# Patient Record
Sex: FEMALE | Race: OTHER | Employment: OTHER | URBAN - METROPOLITAN AREA
[De-identification: names, ages, dates, MRNs, and addresses within clinical notes are randomized per-mention and may not be internally consistent; named-entity substitution may affect disease eponyms.]

---

## 2017-01-27 ENCOUNTER — OFFICE VISIT (OUTPATIENT)
Dept: FAMILY MEDICINE CLINIC | Facility: CLINIC | Age: 67
End: 2017-01-27

## 2017-01-27 VITALS
SYSTOLIC BLOOD PRESSURE: 116 MMHG | HEART RATE: 84 BPM | DIASTOLIC BLOOD PRESSURE: 64 MMHG | HEIGHT: 57 IN | OXYGEN SATURATION: 99 % | TEMPERATURE: 99 F | RESPIRATION RATE: 14 BRPM | BODY MASS INDEX: 31.28 KG/M2 | WEIGHT: 145 LBS

## 2017-01-27 DIAGNOSIS — I10 ESSENTIAL HYPERTENSION WITH GOAL BLOOD PRESSURE LESS THAN 130/80: ICD-10-CM

## 2017-01-27 DIAGNOSIS — Z12.39 SCREENING FOR BREAST CANCER: ICD-10-CM

## 2017-01-27 DIAGNOSIS — Z79.899 ENCOUNTER FOR LONG-TERM (CURRENT) USE OF MEDICATIONS: Primary | ICD-10-CM

## 2017-01-27 DIAGNOSIS — Z23 NEED FOR IMMUNIZATION AGAINST INFLUENZA: ICD-10-CM

## 2017-01-27 DIAGNOSIS — M17.0 PRIMARY OSTEOARTHRITIS OF BOTH KNEES: ICD-10-CM

## 2017-01-27 PROCEDURE — 90471 IMMUNIZATION ADMIN: CPT | Performed by: FAMILY MEDICINE

## 2017-01-27 PROCEDURE — 90662 IIV NO PRSV INCREASED AG IM: CPT | Performed by: FAMILY MEDICINE

## 2017-01-27 PROCEDURE — 99213 OFFICE O/P EST LOW 20 MIN: CPT | Performed by: FAMILY MEDICINE

## 2017-01-27 RX ORDER — LOSARTAN POTASSIUM AND HYDROCHLOROTHIAZIDE 25; 100 MG/1; MG/1
1 TABLET ORAL DAILY
Qty: 90 TABLET | Refills: 1 | Status: SHIPPED | OUTPATIENT
Start: 2017-01-27 | End: 2017-07-24

## 2017-01-27 NOTE — PATIENT INSTRUCTIONS
Established High Blood Pressure    High blood pressure (hypertension) is a chronic disease. Often health care providers don’t know what causes it. But it can be caused by certain health conditions and medicines.   If you have high blood pressure, you may · Don’t take medicines that have heart stimulants. This includes many cold and sinus decongestant pills and sprays, as well as diet pills. Check the warnings about hypertension on the label.  Stimulants such as amphetamine or cocaine could be lethal for jose © 8126-8056 [address], [address] [place]. All rights reserved. This information is not intended as a substitute for professional medical care. Always follow your healthcare professional's instructions.

## 2017-01-30 NOTE — PROGRESS NOTES
Yaa Sumner is a 73year old female.   Patient presents with:  Medication Follow-Up: Losartan    HPI:   HTN:Patient is seen for follow up and medication refill, states compliance with medication and diet, does not exercise due to knee pain, denies DO Encounter for long-term (current) use of medications    Essential hypertension with goal blood pressure less than 130/80  -     Losartan Potassium-HCTZ 100-25 MG Oral Tab; Take 1 tablet by mouth daily. -     Comp Metabolic Panel (14) [E];  Future  -     Benjie Chairez · Cough medicines are available but it is unclear how effective they actually are. · Take acetaminophen or an NSAID, such as ibuprofen to ease throat pain  Ease digestive problems  · Put fluid back into your body.  Take frequent sips of clear liquids such

## 2017-01-31 LAB
ALBUMIN/GLOBULIN RATIO: 1.4 (CALC) (ref 1–2.5)
ALBUMIN: 4.2 G/DL (ref 3.6–5.1)
ALKALINE PHOSPHATASE: 75 U/L (ref 33–130)
ALT: 14 U/L (ref 6–29)
AST: 16 U/L (ref 10–35)
BILIRUBIN, TOTAL: 0.6 MG/DL (ref 0.2–1.2)
BUN: 11 MG/DL (ref 7–25)
CALCIUM: 9.8 MG/DL (ref 8.6–10.4)
CARBON DIOXIDE: 27 MMOL/L (ref 20–31)
CHLORIDE: 102 MMOL/L (ref 98–110)
CHOL/HDLC RATIO: 4.1 (CALC)
CHOLESTEROL, TOTAL: 203 MG/DL (ref 125–200)
CREATININE: 0.64 MG/DL (ref 0.5–0.99)
EGFR IF AFRICN AM: 109 ML/MIN/1.73M2
EGFR IF NONAFRICN AM: 94 ML/MIN/1.73M2
GLOBULIN: 2.9 G/DL (CALC) (ref 1.9–3.7)
GLUCOSE: 94 MG/DL (ref 65–99)
HDL CHOLESTEROL: 49 MG/DL
LDL-CHOLESTEROL: 121 MG/DL (CALC)
NON-HDL CHOLESTEROL: 154 MG/DL (CALC)
POTASSIUM: 4 MMOL/L (ref 3.5–5.3)
PROTEIN, TOTAL: 7.1 G/DL (ref 6.1–8.1)
SODIUM: 137 MMOL/L (ref 135–146)
TRIGLYCERIDES: 165 MG/DL

## 2017-02-02 NOTE — PROGRESS NOTES
Quick Note:    Spoke to patient informed of results and information given. Had patient repeat some information back to me. So that I knew she understood.   ______

## 2017-02-04 ENCOUNTER — HOSPITAL ENCOUNTER (OUTPATIENT)
Dept: MAMMOGRAPHY | Facility: HOSPITAL | Age: 67
Discharge: HOME | End: 2017-02-04
Attending: FAMILY MEDICINE
Payer: COMMERCIAL

## 2017-02-04 DIAGNOSIS — Z12.39 SCREENING FOR BREAST CANCER: ICD-10-CM

## 2017-02-04 PROCEDURE — 77067 SCR MAMMO BI INCL CAD: CPT

## 2017-07-14 PROBLEM — M17.12 PRIMARY OSTEOARTHRITIS OF LEFT KNEE: Status: ACTIVE | Noted: 2017-07-14

## 2017-07-24 ENCOUNTER — OFFICE VISIT (OUTPATIENT)
Dept: FAMILY MEDICINE CLINIC | Facility: CLINIC | Age: 67
End: 2017-07-24

## 2017-07-24 VITALS
TEMPERATURE: 98 F | BODY MASS INDEX: 31.07 KG/M2 | SYSTOLIC BLOOD PRESSURE: 102 MMHG | OXYGEN SATURATION: 99 % | WEIGHT: 144 LBS | HEART RATE: 86 BPM | HEIGHT: 57.25 IN | RESPIRATION RATE: 16 BRPM | DIASTOLIC BLOOD PRESSURE: 58 MMHG

## 2017-07-24 DIAGNOSIS — Z79.899 ENCOUNTER FOR LONG-TERM (CURRENT) USE OF MEDICATIONS: Primary | ICD-10-CM

## 2017-07-24 DIAGNOSIS — I10 ESSENTIAL HYPERTENSION WITH GOAL BLOOD PRESSURE LESS THAN 130/80: ICD-10-CM

## 2017-07-24 DIAGNOSIS — M17.12 PRIMARY OSTEOARTHRITIS OF LEFT KNEE: ICD-10-CM

## 2017-07-24 PROCEDURE — 99213 OFFICE O/P EST LOW 20 MIN: CPT | Performed by: FAMILY MEDICINE

## 2017-07-24 RX ORDER — LOSARTAN POTASSIUM AND HYDROCHLOROTHIAZIDE 25; 100 MG/1; MG/1
1 TABLET ORAL DAILY
Qty: 90 TABLET | Refills: 1 | Status: SHIPPED | OUTPATIENT
Start: 2017-07-24 | End: 2018-02-01

## 2017-07-24 NOTE — PROGRESS NOTES
Calvin Lomeli is a 73year old female.   Patient presents with:  Medication Follow-Up    HPI:   HTN:Patient is seen for follow up and medication refill, states compliance with medication and diet, does not exercise due to knee pain, denies FLORES, palpita this visit:    Encounter for long-term (current) use of medications    Essential hypertension with goal blood pressure less than 130/80  -     Losartan Potassium-HCTZ 100-25 MG Oral Tab; Take 1 tablet by mouth daily.   Encouraged low salt diet and to contin

## 2017-07-24 NOTE — PATIENT INSTRUCTIONS
Osteoarthritis: Tips for Daily Living     Lift items with both hands. Making a few changes in your daily life can reduce stress on your joints. This helps protect the joints from further damage.   Your surroundings  Make your home work for you:  · Edyta Roth © 9072-4483 The [address] [place] [address], [address] [place]. All rights reserved. This information is not intended as a substitute for professional medical care. Always follow your healthcare professional's instructions.         Control · When you can, walk or bike instead of driving. · Smith leaves, garden, or do household repairs.   · Be active at a moderate to vigorous level of physical activity for at least 40 minutes for a minimum of 3 to 4 days a week.   Manage stress  · Make time to

## 2017-09-26 ENCOUNTER — OFFICE VISIT (OUTPATIENT)
Dept: FAMILY MEDICINE CLINIC | Facility: CLINIC | Age: 67
End: 2017-09-26

## 2017-09-26 VITALS
HEIGHT: 57 IN | BODY MASS INDEX: 30.29 KG/M2 | WEIGHT: 140.38 LBS | SYSTOLIC BLOOD PRESSURE: 104 MMHG | RESPIRATION RATE: 14 BRPM | DIASTOLIC BLOOD PRESSURE: 68 MMHG | TEMPERATURE: 98 F | HEART RATE: 74 BPM

## 2017-09-26 DIAGNOSIS — Z01.818 PREOP GENERAL PHYSICAL EXAM: Primary | ICD-10-CM

## 2017-09-26 DIAGNOSIS — M17.12 PRIMARY OSTEOARTHRITIS OF LEFT KNEE: ICD-10-CM

## 2017-09-26 DIAGNOSIS — E66.9 OBESITY (BMI 30.0-34.9): ICD-10-CM

## 2017-09-26 DIAGNOSIS — I10 ESSENTIAL HYPERTENSION: ICD-10-CM

## 2017-09-26 PROCEDURE — 93000 ELECTROCARDIOGRAM COMPLETE: CPT | Performed by: FAMILY MEDICINE

## 2017-09-26 PROCEDURE — 87081 CULTURE SCREEN ONLY: CPT | Performed by: FAMILY MEDICINE

## 2017-09-26 PROCEDURE — 87147 CULTURE TYPE IMMUNOLOGIC: CPT | Performed by: FAMILY MEDICINE

## 2017-09-26 PROCEDURE — 99243 OFF/OP CNSLTJ NEW/EST LOW 30: CPT | Performed by: FAMILY MEDICINE

## 2017-09-26 NOTE — PROGRESS NOTES
Shraddha Wells is a 78year old female who presents for a pre-operative physical exam.   Patient presents with:  Pre-Op Exam: Pre-op surgery on 10/16/17 total knee replacement on Left knee.       HPI:   Patient is seen for preoperative physical exam and chest pain on exertion  GI: denies abdominal pain,denies heartburn  MUSCULOSKELETAL: denies back pain  NEURO: denies headaches  PSYCHE: denies depression or anxiety  HEMATOLOGIC: denies hx of anemia  ENDOCRINE: denies thyroid history  ALL/ASTHMA: denies hx

## 2017-09-27 LAB
ABSOLUTE BASOPHILS: 49 CELLS/UL (ref 0–200)
ABSOLUTE EOSINOPHILS: 555 CELLS/UL (ref 15–500)
ABSOLUTE LYMPHOCYTES: 2385 CELLS/UL (ref 850–3900)
ABSOLUTE MONOCYTES: 506 CELLS/UL (ref 200–950)
ABSOLUTE NEUTROPHILS: 2605 CELLS/UL (ref 1500–7800)
ALBUMIN/GLOBULIN RATIO: 1.8 (CALC) (ref 1–2.5)
ALBUMIN: 4.4 G/DL (ref 3.6–5.1)
ALKALINE PHOSPHATASE: 65 U/L (ref 33–130)
ALT: 12 U/L (ref 6–29)
AST: 16 U/L (ref 10–35)
BASOPHILS: 0.8 %
BILIRUBIN, TOTAL: 0.7 MG/DL (ref 0.2–1.2)
BUN: 11 MG/DL (ref 7–25)
CALCIUM: 9.8 MG/DL (ref 8.6–10.4)
CARBON DIOXIDE: 28 MMOL/L (ref 20–31)
CHLORIDE: 102 MMOL/L (ref 98–110)
CREATININE: 0.58 MG/DL (ref 0.5–0.99)
EGFR IF AFRICN AM: 111 ML/MIN/1.73M2
EGFR IF NONAFRICN AM: 96 ML/MIN/1.73M2
EOSINOPHILS: 9.1 %
GLOBULIN: 2.5 G/DL (CALC) (ref 1.9–3.7)
GLUCOSE: 81 MG/DL (ref 65–99)
HEMATOCRIT: 33.3 % (ref 35–45)
HEMOGLOBIN: 11.5 G/DL (ref 11.7–15.5)
LYMPHOCYTES: 39.1 %
MCH: 29.4 PG (ref 27–33)
MCHC: 34.5 G/DL (ref 32–36)
MCV: 85.2 FL (ref 80–100)
MONOCYTES: 8.3 %
MPV: 11.5 FL (ref 7.5–12.5)
NEUTROPHILS: 42.7 %
PLATELET COUNT: 238 THOUSAND/UL (ref 140–400)
POTASSIUM: 3.7 MMOL/L (ref 3.5–5.3)
PROTEIN, TOTAL: 6.9 G/DL (ref 6.1–8.1)
RDW: 13 % (ref 11–15)
RED BLOOD CELL COUNT: 3.91 MILLION/UL (ref 3.8–5.1)
SODIUM: 138 MMOL/L (ref 135–146)
WHITE BLOOD CELL COUNT: 6.1 THOUSAND/UL (ref 3.8–10.8)

## 2017-10-03 ENCOUNTER — TELEPHONE (OUTPATIENT)
Dept: FAMILY MEDICINE CLINIC | Facility: CLINIC | Age: 67
End: 2017-10-03

## 2017-10-03 NOTE — TELEPHONE ENCOUNTER
Daughter states someone called her Saturday and said her mom has an infection. We called in a script, but [facility] says they do not have it. Transferred to triage line.

## 2017-10-03 NOTE — TELEPHONE ENCOUNTER
Spoke to daughter sent Rx to [facility]. Daughter aware. Spoke to pharmacy the Rx sent to [facility] is not available.

## 2017-10-03 NOTE — TELEPHONE ENCOUNTER
Need to check other pharmacies as this is the recommended medication, needs to start at least 5 days before surgery.

## 2017-10-09 ENCOUNTER — HOSPITAL ENCOUNTER (OUTPATIENT)
Dept: PHYSICAL THERAPY | Facility: HOSPITAL | Age: 67
Discharge: HOME | End: 2017-10-09
Attending: ORTHOPAEDIC SURGERY
Payer: COMMERCIAL

## 2017-10-09 ENCOUNTER — LABORATORY ENCOUNTER (OUTPATIENT)
Dept: LAB | Facility: HOSPITAL | Age: 67
End: 2017-10-09
Payer: COMMERCIAL

## 2017-10-09 DIAGNOSIS — M17.12 PRIMARY OSTEOARTHRITIS OF LEFT KNEE: ICD-10-CM

## 2017-10-09 PROCEDURE — 36415 COLL VENOUS BLD VENIPUNCTURE: CPT

## 2017-10-09 PROCEDURE — 86850 RBC ANTIBODY SCREEN: CPT

## 2017-10-09 PROCEDURE — 86901 BLOOD TYPING SEROLOGIC RH(D): CPT

## 2017-10-09 PROCEDURE — 86900 BLOOD TYPING SEROLOGIC ABO: CPT

## 2017-10-10 ENCOUNTER — TELEPHONE (OUTPATIENT)
Dept: FAMILY MEDICINE CLINIC | Facility: CLINIC | Age: 67
End: 2017-10-10

## 2017-10-16 ENCOUNTER — ANESTHESIA (OUTPATIENT)
Dept: SURGERY | Facility: HOSPITAL | Age: 67
DRG: 470 | End: 2017-10-16
Payer: COMMERCIAL

## 2017-10-16 ENCOUNTER — APPOINTMENT (OUTPATIENT)
Dept: GENERAL RADIOLOGY | Facility: HOSPITAL | Age: 67
DRG: 470 | End: 2017-10-16
Attending: ORTHOPAEDIC SURGERY
Payer: COMMERCIAL

## 2017-10-16 ENCOUNTER — HOSPITAL ENCOUNTER (INPATIENT)
Facility: HOSPITAL | Age: 67
LOS: 2 days | Discharge: HOME HEALTH | DRG: 470 | End: 2017-10-18
Attending: ORTHOPAEDIC SURGERY | Admitting: ORTHOPAEDIC SURGERY
Payer: COMMERCIAL

## 2017-10-16 ENCOUNTER — ANESTHESIA EVENT (OUTPATIENT)
Dept: SURGERY | Facility: HOSPITAL | Age: 67
DRG: 470 | End: 2017-10-16
Payer: COMMERCIAL

## 2017-10-16 ENCOUNTER — SURGERY (OUTPATIENT)
Age: 67
End: 2017-10-16

## 2017-10-16 DIAGNOSIS — M17.12 PRIMARY OSTEOARTHRITIS OF LEFT KNEE: Primary | ICD-10-CM

## 2017-10-16 PROCEDURE — 3E0T3BZ INTRODUCTION OF ANESTHETIC AGENT INTO PERIPHERAL NERVES AND PLEXI, PERCUTANEOUS APPROACH: ICD-10-PCS | Performed by: ANESTHESIOLOGY

## 2017-10-16 PROCEDURE — 0SRD0J9 REPLACEMENT OF LEFT KNEE JOINT WITH SYNTHETIC SUBSTITUTE, CEMENTED, OPEN APPROACH: ICD-10-PCS | Performed by: ORTHOPAEDIC SURGERY

## 2017-10-16 PROCEDURE — 73560 X-RAY EXAM OF KNEE 1 OR 2: CPT | Performed by: ORTHOPAEDIC SURGERY

## 2017-10-16 PROCEDURE — 99223 1ST HOSP IP/OBS HIGH 75: CPT | Performed by: INTERNAL MEDICINE

## 2017-10-16 DEVICE — CEMENT BONE ZIM PALICOS R +G: Type: IMPLANTABLE DEVICE | Site: KNEE | Status: FUNCTIONAL

## 2017-10-16 DEVICE — ALL POLY PAT VE 29MM: Type: IMPLANTABLE DEVICE | Site: KNEE | Status: FUNCTIONAL

## 2017-10-16 DEVICE — KIT TISS CLOS TISSEEL 4ML: Type: IMPLANTABLE DEVICE | Site: KNEE | Status: FUNCTIONAL

## 2017-10-16 DEVICE — PSN JIGS PREF & TIB ROT L: Type: IMPLANTABLE DEVICE | Site: KNEE | Status: FUNCTIONAL

## 2017-10-16 DEVICE — PSN TIB STM 5 DEG SZ D L: Type: IMPLANTABLE DEVICE | Site: KNEE | Status: FUNCTIONAL

## 2017-10-16 DEVICE — IMPLANTABLE DEVICE: Type: IMPLANTABLE DEVICE | Site: KNEE | Status: FUNCTIONAL

## 2017-10-16 RX ORDER — DIPHENHYDRAMINE HYDROCHLORIDE 50 MG/ML
12.5 INJECTION INTRAMUSCULAR; INTRAVENOUS EVERY 4 HOURS PRN
Status: DISCONTINUED | OUTPATIENT
Start: 2017-10-16 | End: 2017-10-18

## 2017-10-16 RX ORDER — OXYCODONE HYDROCHLORIDE 10 MG/1
10 TABLET ORAL EVERY 4 HOURS PRN
Status: DISPENSED | OUTPATIENT
Start: 2017-10-16 | End: 2017-10-18

## 2017-10-16 RX ORDER — MIDAZOLAM HYDROCHLORIDE 1 MG/ML
1 INJECTION INTRAMUSCULAR; INTRAVENOUS EVERY 5 MIN PRN
Status: DISCONTINUED | OUTPATIENT
Start: 2017-10-16 | End: 2017-10-16 | Stop reason: HOSPADM

## 2017-10-16 RX ORDER — BISACODYL 10 MG
10 SUPPOSITORY, RECTAL RECTAL
Status: DISCONTINUED | OUTPATIENT
Start: 2017-10-16 | End: 2017-10-18

## 2017-10-16 RX ORDER — MELATONIN
325
Status: DISCONTINUED | OUTPATIENT
Start: 2017-10-17 | End: 2017-10-18

## 2017-10-16 RX ORDER — ACETAMINOPHEN 325 MG/1
650 TABLET ORAL EVERY 6 HOURS PRN
Status: DISCONTINUED | OUTPATIENT
Start: 2017-10-16 | End: 2017-10-16 | Stop reason: HOSPADM

## 2017-10-16 RX ORDER — METOCLOPRAMIDE HYDROCHLORIDE 5 MG/ML
10 INJECTION INTRAMUSCULAR; INTRAVENOUS AS NEEDED
Status: DISCONTINUED | OUTPATIENT
Start: 2017-10-16 | End: 2017-10-16 | Stop reason: HOSPADM

## 2017-10-16 RX ORDER — NALOXONE HYDROCHLORIDE 0.4 MG/ML
80 INJECTION, SOLUTION INTRAMUSCULAR; INTRAVENOUS; SUBCUTANEOUS AS NEEDED
Status: DISCONTINUED | OUTPATIENT
Start: 2017-10-16 | End: 2017-10-16 | Stop reason: HOSPADM

## 2017-10-16 RX ORDER — OXYCODONE HCL 10 MG/1
10 TABLET, FILM COATED, EXTENDED RELEASE ORAL
Status: COMPLETED | OUTPATIENT
Start: 2017-10-16 | End: 2017-10-16

## 2017-10-16 RX ORDER — DIPHENHYDRAMINE HYDROCHLORIDE 50 MG/ML
25 INJECTION INTRAMUSCULAR; INTRAVENOUS ONCE AS NEEDED
Status: ACTIVE | OUTPATIENT
Start: 2017-10-16 | End: 2017-10-16

## 2017-10-16 RX ORDER — ACETAMINOPHEN 325 MG/1
650 TABLET ORAL 4 TIMES DAILY
Status: DISPENSED | OUTPATIENT
Start: 2017-10-16 | End: 2017-10-18

## 2017-10-16 RX ORDER — DOCUSATE SODIUM 100 MG/1
100 CAPSULE, LIQUID FILLED ORAL 2 TIMES DAILY
Status: DISCONTINUED | OUTPATIENT
Start: 2017-10-16 | End: 2017-10-18

## 2017-10-16 RX ORDER — POLYETHYLENE GLYCOL 3350 17 G/17G
17 POWDER, FOR SOLUTION ORAL DAILY PRN
Status: DISCONTINUED | OUTPATIENT
Start: 2017-10-16 | End: 2017-10-18

## 2017-10-16 RX ORDER — HYDROMORPHONE HYDROCHLORIDE 1 MG/ML
0.8 INJECTION, SOLUTION INTRAMUSCULAR; INTRAVENOUS; SUBCUTANEOUS EVERY 2 HOUR PRN
Status: DISCONTINUED | OUTPATIENT
Start: 2017-10-16 | End: 2017-10-17

## 2017-10-16 RX ORDER — SODIUM CHLORIDE, SODIUM LACTATE, POTASSIUM CHLORIDE, CALCIUM CHLORIDE 600; 310; 30; 20 MG/100ML; MG/100ML; MG/100ML; MG/100ML
INJECTION, SOLUTION INTRAVENOUS CONTINUOUS
Status: DISCONTINUED | OUTPATIENT
Start: 2017-10-16 | End: 2017-10-16

## 2017-10-16 RX ORDER — OXYCODONE HYDROCHLORIDE 5 MG/1
5 TABLET ORAL EVERY 4 HOURS PRN
Status: DISPENSED | OUTPATIENT
Start: 2017-10-16 | End: 2017-10-18

## 2017-10-16 RX ORDER — OXYCODONE HYDROCHLORIDE 15 MG/1
15 TABLET ORAL EVERY 4 HOURS PRN
Status: ACTIVE | OUTPATIENT
Start: 2017-10-16 | End: 2017-10-18

## 2017-10-16 RX ORDER — MEPERIDINE HYDROCHLORIDE 25 MG/ML
12.5 INJECTION INTRAMUSCULAR; INTRAVENOUS; SUBCUTANEOUS AS NEEDED
Status: DISCONTINUED | OUTPATIENT
Start: 2017-10-16 | End: 2017-10-16 | Stop reason: HOSPADM

## 2017-10-16 RX ORDER — METOCLOPRAMIDE HYDROCHLORIDE 5 MG/ML
10 INJECTION INTRAMUSCULAR; INTRAVENOUS EVERY 6 HOURS PRN
Status: DISPENSED | OUTPATIENT
Start: 2017-10-16 | End: 2017-10-18

## 2017-10-16 RX ORDER — DIPHENHYDRAMINE HCL 25 MG
25 CAPSULE ORAL EVERY 4 HOURS PRN
Status: DISCONTINUED | OUTPATIENT
Start: 2017-10-16 | End: 2017-10-18

## 2017-10-16 RX ORDER — ACETAMINOPHEN 325 MG/1
TABLET ORAL
Status: COMPLETED
Start: 2017-10-16 | End: 2017-10-16

## 2017-10-16 RX ORDER — SODIUM PHOSPHATE, DIBASIC AND SODIUM PHOSPHATE, MONOBASIC 7; 19 G/133ML; G/133ML
1 ENEMA RECTAL ONCE AS NEEDED
Status: DISCONTINUED | OUTPATIENT
Start: 2017-10-16 | End: 2017-10-18

## 2017-10-16 RX ORDER — CEFAZOLIN SODIUM 1 G/3ML
INJECTION, POWDER, FOR SOLUTION INTRAMUSCULAR; INTRAVENOUS
Status: DISCONTINUED | OUTPATIENT
Start: 2017-10-16 | End: 2017-10-16 | Stop reason: HOSPADM

## 2017-10-16 RX ORDER — SODIUM CHLORIDE 9 MG/ML
INJECTION, SOLUTION INTRAVENOUS CONTINUOUS
Status: DISCONTINUED | OUTPATIENT
Start: 2017-10-16 | End: 2017-10-18

## 2017-10-16 RX ORDER — SENNOSIDES 8.6 MG
17.2 TABLET ORAL NIGHTLY
Status: DISCONTINUED | OUTPATIENT
Start: 2017-10-16 | End: 2017-10-18

## 2017-10-16 RX ORDER — ONDANSETRON 2 MG/ML
INJECTION INTRAMUSCULAR; INTRAVENOUS
Status: COMPLETED
Start: 2017-10-16 | End: 2017-10-16

## 2017-10-16 RX ORDER — CYCLOBENZAPRINE HCL 5 MG
5 TABLET ORAL 3 TIMES DAILY PRN
Status: DISCONTINUED | OUTPATIENT
Start: 2017-10-16 | End: 2017-10-18

## 2017-10-16 RX ORDER — HYDROMORPHONE HYDROCHLORIDE 1 MG/ML
0.4 INJECTION, SOLUTION INTRAMUSCULAR; INTRAVENOUS; SUBCUTANEOUS EVERY 2 HOUR PRN
Status: ACTIVE | OUTPATIENT
Start: 2017-10-16 | End: 2017-10-18

## 2017-10-16 RX ORDER — HYDROMORPHONE HYDROCHLORIDE 1 MG/ML
0.4 INJECTION, SOLUTION INTRAMUSCULAR; INTRAVENOUS; SUBCUTANEOUS EVERY 5 MIN PRN
Status: DISCONTINUED | OUTPATIENT
Start: 2017-10-16 | End: 2017-10-16 | Stop reason: HOSPADM

## 2017-10-16 RX ORDER — HYDROMORPHONE HYDROCHLORIDE 1 MG/ML
0.2 INJECTION, SOLUTION INTRAMUSCULAR; INTRAVENOUS; SUBCUTANEOUS EVERY 2 HOUR PRN
Status: ACTIVE | OUTPATIENT
Start: 2017-10-16 | End: 2017-10-18

## 2017-10-16 RX ORDER — ONDANSETRON 2 MG/ML
4 INJECTION INTRAMUSCULAR; INTRAVENOUS AS NEEDED
Status: DISCONTINUED | OUTPATIENT
Start: 2017-10-16 | End: 2017-10-16

## 2017-10-16 RX ORDER — ONDANSETRON 2 MG/ML
4 INJECTION INTRAMUSCULAR; INTRAVENOUS EVERY 4 HOURS PRN
Status: DISCONTINUED | OUTPATIENT
Start: 2017-10-16 | End: 2017-10-18

## 2017-10-16 NOTE — PLAN OF CARE
NURSING ADMISSION NOTE      Patient admitted via BED  Oriented to room. Safety precautions initiated. Bed in low position. Call light in reach. Sleepy, easily arouseable. Complaining of nausea.  Primarily speaks [place], daughter and  at bed

## 2017-10-16 NOTE — ANESTHESIA PREPROCEDURE EVALUATION
PRE-OP EVALUATION    Patient Name: Meche Martin    Pre-op Diagnosis: LEFT KNEE OSTEOARTHRITIS    Procedure(s):  LEFT TOTAL KNEE ARTHROPLASTY    Surgeon(s) and Role:     * [place] Moncada MD - Primary    Pre-op vitals reviewed.   Temp: 98.2 °F (36.8 34.5 09/26/2017   RDW 13.0 09/26/2017    09/26/2017       Lab Results  Component Value Date    09/26/2017   K 3.7 09/26/2017    09/26/2017   CO2 28 09/26/2017   BUN 11 09/26/2017   CREATSERUM 0.58 09/26/2017   GLU 81 09/26/2017   CA 9.8

## 2017-10-16 NOTE — PLAN OF CARE
GASTROINTESTINAL - ADULT    • Minimal or absence of nausea and vomiting Progressing        Impaired Functional Mobility    • Achieve highest/safest level of mobility/gait Progressing        PAIN - ADULT    • Verbalizes/displays adequate comfort level or pa

## 2017-10-16 NOTE — CONSULTS
SUE HOSPITALIST  CONSULT     Trevor Leary Patient Status:  Inpatient    1950 MRN [id number]   [facility] 3SW-A Attending Malgorzata Rivera MD   Hosp Day # 0 PCP Michael Hull MD     Reason for consult: medical management 140/85   Pulse 80   Temp (!) 97.5 °F (36.4 °C) (Temporal)   Resp 10   Ht 4' 9\" (1.448 m)   Wt 143 lb (64.9 kg)   SpO2 100%   BMI 30.94 kg/m²   General: No acute distress. Alert and oriented x 3. HEENT: Normocephalic atraumatic. Moist mucous membranes.  EO

## 2017-10-16 NOTE — PHYSICAL THERAPY NOTE
PHYSICAL THERAPY KNEE EVALUATION - INPATIENT     Room Number: 363/363-A  Evaluation Date: 10/16/2017  Type of Evaluation: Initial  Physician Order: PT Eval and Treat    Presenting Problem: s/p L TKA  Reason for Therapy: Mobility Dysfunction and Discharge P anymore\"    Patient self-stated goal is get up    OBJECTIVE  Precautions:  Other (Comment) (speaks Macedonian)  Fall Risk: High fall risk    WEIGHT BEARING RESTRICTION  Weight Bearing Restriction: L lower extremity           L Lower Extremity: Weight Bearing a currently need. ..   -   Moving to and from a bed to a chair (including a wheelchair)?: A Little   -   Need to walk in hospital room?: A Little   -   Climbing 3-5 steps with a railing?: A Lot       AM-PAC Score:  Raw Score: 17   PT Approx Degree of Impairme and comorbidities manifest themselves as functional limitations in independent bed mobility, transfers, gait and stoop negotiation.   The patient is below baseline and would benefit from skilled inpatient PT to address the above deficits to assist patient i

## 2017-10-16 NOTE — ANESTHESIA POSTPROCEDURE EVALUATION
Ricardo 68 Patient Status:  Surgery Admit   Age/Gender 78year old female MRN [id number]   Location [facility] Attending Belle Moncada MD   Hosp Day # 0 PCP Jessica Gil MD       Anesthesia

## 2017-10-16 NOTE — PHYSICAL THERAPY NOTE
Order received, chart reviewed. Per d/w RN, pt with increased nausea, not appropriate for PT at this time. Will follow up as able or 10/17.

## 2017-10-17 PROCEDURE — 99232 SBSQ HOSP IP/OBS MODERATE 35: CPT | Performed by: INTERNAL MEDICINE

## 2017-10-17 RX ORDER — HYDROCODONE BITARTRATE AND ACETAMINOPHEN 10; 325 MG/1; MG/1
2 TABLET ORAL EVERY 4 HOURS PRN
Status: DISCONTINUED | OUTPATIENT
Start: 2017-10-18 | End: 2017-10-18

## 2017-10-17 RX ORDER — HYDROCODONE BITARTRATE AND ACETAMINOPHEN 10; 325 MG/1; MG/1
1 TABLET ORAL EVERY 4 HOURS PRN
Status: DISCONTINUED | OUTPATIENT
Start: 2017-10-18 | End: 2017-10-18

## 2017-10-17 NOTE — PHYSICAL THERAPY NOTE
PHYSICAL THERAPY KNEE TREATMENT NOTE - INPATIENT     Room Number: 363/363-A     Session: 1&2   Number of Visits to Meet Established Goals: 3    Presenting Problem: s/p L TKA    Problem List  Active Problems:    HTN (hypertension)    Obesity (BMI 30.0-34. 9 person does the patient currently need. ..   -   Moving to and from a bed to a chair (including a wheelchair)?: A Little   -   Need to walk in hospital room?: A Little   -   Climbing 3-5 steps with a railing?: A Little       AM-PAC Score:  Raw Score: 18   P present yes   is a spouse,daughter    Knee ROM            L Knee Extension (degrees): 5    Patient End of Session: Up in chair;Needs met;RN aware of session/findings; All patient questions and concerns addressed; Family present;Call light within reach degrees flexion      Goal #6        Goal Comments: Goals established on 10/16/2017 progressing toward all

## 2017-10-17 NOTE — HOME CARE LIAISON
[facility] NOT CONTRACTED WITH PTNT INSURANCE  Brittany [facility] TO ACCEPT    THANKS  Yvon Taylor

## 2017-10-17 NOTE — PAYOR COMM NOTE
--------------  ADMISSION REVIEW     Payor: [address] [place] [address] #:  [id number]  Authorization Number: Jacqualyn Landen date: 10/16/17  Admit time: 1353       Admitting Physician: Adalberto Francis MD  Attending Physician:  Coral Route User    10/16/2017 2102 Given 17.2 mg Oral Neil Diaz RN      0.9%  NaCl infusion     Date Action Dose Route User    10/16/2017 1318 New Bag (none) Intravenous Judy Cook, RN      Tranexamic Acid (CYKLOKAPRON) 1,000 mg in sodium chl

## 2017-10-17 NOTE — PROGRESS NOTES
Mild pain  Dressing intact, dry  nvi  Calf st nt    Hg  10.3  Cont PT  DC home tomorrow after P  CPM

## 2017-10-17 NOTE — CM/SW NOTE
10/17/17 1300   CM/SW Referral Data   Referral Source Social Work (self-referral); Physician   Reason for Referral Discharge planning   Informant Patient;Spouse; Children   Pertinent Medical Hx   Primary Care Physician Name Felix   Patient Pelon Stewart

## 2017-10-17 NOTE — OPERATIVE REPORT
[facility]    PATIENT'S NAME: Miguel Kruse   ATTENDING PHYSICIAN: Adan Lamb M.D. OPERATING PHYSICIAN: Adan Lamb M.D.    PATIENT ACCOUNT#:   [de-identified]    LOCATION:  [address] [place] [address] #:   [id number]       DATE OF BIR placed. Good flexion and extension spaces were noted. Good medial and lateral stability. Then the patella was cut, this measured a size 29 patella, 8 mm of bone was removed. Central 3 PEG holes were drilled.   There was some lateral tracking of the [place]

## 2017-10-17 NOTE — PROGRESS NOTES
SUE HOSPITALIST  Progress Note     Eusebio Kostas Patient Status:  Inpatient    1950 MRN [id number]   [facility] 3SW-A Attending Sara Moncada MD   Hosp Day # 1 PCP Raymon Felix MD     Chief Complaint: TKR    S: Patient 1. ECG  2. Hg  3. IVF  4. Tele monitor   5. Likely pain related  6. NOT hypoxic or hypotensive  7. Monitor close  2. Left knee OA s/p TKR POD 1  1. Pain contro l  2. PT OT   3. Eliquis  3. HTN - hyzaar  4.  Obesity     Quality:  · DVT Prophylaxis: eliquis

## 2017-10-17 NOTE — OCCUPATIONAL THERAPY NOTE
OCCUPATIONAL THERAPY EVALUATION - INPATIENT     Room Number: 363/363-A  Evaluation Date: 10/17/2017  Type of Evaluation: Initial  Presenting Problem:  (L TKA)    Physician Order: IP Consult to Occupational Therapy  Reason for Therapy: ADL/IADL Dysfunction Techniques: Relaxation;Repositioning    COGNITION  Alert, follow commands, makes needs known, appears somewhat distractible    VISION  WFL for eval    PERCEPTION  Appears WFL    SENSATION  No deficits noted or reported    Communication: ipad  us concerns addressed; Family present; Other (Comment) (on way to PT gym)    ASSESSMENT     Patient is a 78year old female admitted on 10/16/2017 for elective L TKA. [facility] Complete medical history and occupational profile noted above.  Functional outcome measures c adaptive equipment as needed with supervision.     Functional Transfer Goals  Patient will complete toilet transfers with supervision and good safety  Patient will complete bed mobility with supervision

## 2017-10-17 NOTE — CM/SW NOTE
Per OLAMIDE Guzman, this pts primary insurance is [employer]. Any discharge [facility] [phone] ext [phone]. This information was passed on to covering  Tereso Adkins.

## 2017-10-17 NOTE — CM/SW NOTE
3:50pm  MSW called [facility] [phone] to get Leonor 78 options. MSW referred to [facility]. Referral sent via [facility].    4:05pm    MSW called Ria  From [employer] to get list of providers. She states she will give auth.  Referr

## 2017-10-17 NOTE — PLAN OF CARE
Pt's heart rate this am 120s-130s while being monitored on the continuous pulse ox. HR up to 130s-140s while working with Occupational Therapy. Pt denies any chest pain, shortness of breath, or dizziness.  Blood pressure 154/75 this am. Pt attending [facility]

## 2017-10-18 VITALS
WEIGHT: 143 LBS | OXYGEN SATURATION: 96 % | DIASTOLIC BLOOD PRESSURE: 61 MMHG | SYSTOLIC BLOOD PRESSURE: 123 MMHG | RESPIRATION RATE: 18 BRPM | BODY MASS INDEX: 30.85 KG/M2 | TEMPERATURE: 99 F | HEIGHT: 57 IN | HEART RATE: 107 BPM

## 2017-10-18 PROCEDURE — 99232 SBSQ HOSP IP/OBS MODERATE 35: CPT | Performed by: HOSPITALIST

## 2017-10-18 RX ORDER — POTASSIUM CHLORIDE 20 MEQ/1
40 TABLET, EXTENDED RELEASE ORAL EVERY 4 HOURS
Status: DISCONTINUED | OUTPATIENT
Start: 2017-10-18 | End: 2017-10-18

## 2017-10-18 RX ORDER — MELATONIN
325
Qty: 30 TABLET | Refills: 0 | Status: SHIPPED | OUTPATIENT
Start: 2017-10-19 | End: 2018-02-01 | Stop reason: ALTCHOICE

## 2017-10-18 NOTE — PLAN OF CARE
GASTROINTESTINAL - ADULT    • Minimal or absence of nausea and vomiting Adequate for Discharge        Impaired Activities of Daily Living    • Achieve highest/safest level of independence in self care Adequate for Discharge        Impaired Functional Mobil

## 2017-10-18 NOTE — PROGRESS NOTES
Acute Pain Service    Post Op Day 2 Ortho Note    Patient sitting in chair, smiling, working with OT. Patient states pain is better today. Family at bedside to interpret. Norco prn pain - denies itching/nausea/dizziness.     Patient able to bear weight on s

## 2017-10-18 NOTE — PHYSICAL THERAPY NOTE
PHYSICAL THERAPY KNEE TREATMENT NOTE - INPATIENT     Room Number: 363/363-A     Session: 3  Number of Visits to Meet Established Goals: 3    Presenting Problem: s/p L TKA    Problem List  Active Problems:    HTN (hypertension)    Obesity (BMI 30.0-34. 9) wheelchair)?: A Little   -   Need to walk in hospital room?: A Little   -   Climbing 3-5 steps with a railing?: A Little       AM-PAC Score:  Raw Score: 18   PT Approx Degree of Impairment Score: 46.58%   Standardized Score (AM-PAC Scale): 43.63   CMS Carleen PT;Other (Comment) (c family assist PRN)    PLAN  PT Treatment Plan: Bed mobility; Body mechanics; Endurance; Energy conservation;Patient education; Family education;Gait training;Range of motion;Strengthening;Stoop training;Transfer training;Balance training

## 2017-10-18 NOTE — PROGRESS NOTES
SUE HOSPITALIST  Progress Note     Di [place] Patient Status:  Inpatient    1950 MRN [id number]   [facility] 3SW-A Attending J Luis Moncada MD   Hosp Day # 2 PCP Kaley Lizarraga MD     Chief Complaint: TKR    S: Patient ASSESSMENT / PLAN:     1. Sinus tachycardia   1. ECG- sinus tachycardia  2. Hg - stable  3. Tele monitor   4. Likely pain related  5. NOT hypoxic or hypotensive  6. Monitor close  7. Check K and mg- replace if needed  2.  Left knee OA s/p TKR POD 2  1

## 2017-10-18 NOTE — PLAN OF CARE
HR =112 upon ambulating, Dr. Don Sandifer aware, labs ordered, K+=3.2, replaced per electrolyte protocol, may d/c after 2nd dose(1900),updated on poc.

## 2017-10-18 NOTE — OCCUPATIONAL THERAPY NOTE
OCCUPATIONAL THERAPY TREATMENT NOTE - INPATIENT     Room Number: 363/363-A  Session: 1/2  Number of Visits to Meet Established Goals: 2    Presenting Problem:  (L TKA)    History related to current admission:   Admitted for elective L TKA on 10/16.  History care of personal grooming such as brushing teeth?: None  -   Eating meals?: None    AM-PAC Score:  Score: 21  Approx Degree of Impairment: 32.79%  Standardized Score (AM-PAC Scale): 44.27  CMS Modifier (G-Code): CJ    FUNCTIONAL TRANSFER ASSESSMENT  Supine supervision.      Functional Transfer Goals  Patient will complete toilet transfers with supervision and good safety--MET  Patient will complete bed mobility with supervision

## 2017-10-19 ENCOUNTER — PATIENT OUTREACH (OUTPATIENT)
Dept: CASE MANAGEMENT | Age: 67
End: 2017-10-19

## 2017-10-19 DIAGNOSIS — M17.12 PRIMARY OSTEOARTHRITIS OF LEFT KNEE: ICD-10-CM

## 2017-10-20 ENCOUNTER — TELEPHONE (OUTPATIENT)
Dept: FAMILY MEDICINE CLINIC | Facility: CLINIC | Age: 67
End: 2017-10-20

## 2017-10-20 NOTE — TELEPHONE ENCOUNTER
Patient discharged from [facility] on 10/18/17 S/P Left TKR, NCM scheduled TCM HFU on 10/30/17. Condition update: Patient's daughter states that the occupational therapist is there from [facility] and noticed that her mother's HR was 111.  Guerrero Londono

## 2017-10-20 NOTE — CM/SW NOTE
10/20/17 0800   Discharge disposition   Discharged to: Home-Health   Name of [address]   Discharge transportation Private car   DC 10/18/17

## 2017-10-20 NOTE — PROGRESS NOTES
Initial Post Discharge Follow Up   Discharge Date: 10/18/17  Contact Date: 10/19/2017    Consent Verification:  Assessment Completed With: Patient  Other: Gordo Tran received per patient?  written  HIPAA Verified?   Yes    Discharge Dx: Zoe Arroyo HYDROcodone-acetaminophen (NORCO)  MG Oral Tab Take 1 tablet by mouth every 6 (six) hours as needed for Pain. Disp: 40 tablet Rfl: 0   apixaban 2.5 MG Oral Tab Take 1 tablet (2.5 mg total) by mouth 2 (two) times daily.  Disp: 24 tablet Rfl: 0   Losa your follow up appointments? yes, F/U appointment with ortho on 11/3/17. Is there any reason as to why you cannot make your appointments?    No     NCM Reviewed upcoming Specialist Appt with patient     Yes      Interventions by NCM: NCM reviewed medicat

## 2017-10-20 NOTE — TELEPHONE ENCOUNTER
Future Appointments  Date Time Provider Graciela Kate   10/30/2017 11:00 AM Meri Cisse MD [facility] [address]   11/3/2017 10:00 AM Mala Moncada MD [facility] with daughter, mother doing well, walking little ways around the [facility]

## 2017-10-25 PROCEDURE — 87070 CULTURE OTHR SPECIMN AEROBIC: CPT | Performed by: PHYSICIAN ASSISTANT

## 2017-10-25 PROCEDURE — 87040 BLOOD CULTURE FOR BACTERIA: CPT | Performed by: PHYSICIAN ASSISTANT

## 2017-10-25 PROCEDURE — 87205 SMEAR GRAM STAIN: CPT | Performed by: PHYSICIAN ASSISTANT

## 2017-10-26 PROBLEM — Z96.652 STATUS POST TOTAL LEFT KNEE REPLACEMENT: Status: ACTIVE | Noted: 2017-10-26

## 2017-10-28 NOTE — DISCHARGE SUMMARY
[facility]    PATIENT'S NAME: Anabelle De La Rosa   ATTENDING PHYSICIAN: Bruna Chavez M.D.    PATIENT ACCOUNT#:   [de-identified]    LOCATION:  [address] [place] [address]  MEDICAL RECORD #:   [id number]       YOB: 1950  ADMISSION DATE:       10/16/

## 2017-10-30 ENCOUNTER — OFFICE VISIT (OUTPATIENT)
Dept: FAMILY MEDICINE CLINIC | Facility: CLINIC | Age: 67
End: 2017-10-30

## 2017-10-30 ENCOUNTER — PATIENT OUTREACH (OUTPATIENT)
Dept: FAMILY MEDICINE CLINIC | Facility: CLINIC | Age: 67
End: 2017-10-30

## 2017-10-30 VITALS
RESPIRATION RATE: 16 BRPM | DIASTOLIC BLOOD PRESSURE: 66 MMHG | HEART RATE: 103 BPM | TEMPERATURE: 98 F | SYSTOLIC BLOOD PRESSURE: 140 MMHG

## 2017-10-30 DIAGNOSIS — Z96.652 STATUS POST TOTAL LEFT KNEE REPLACEMENT: ICD-10-CM

## 2017-10-30 DIAGNOSIS — Z09 HOSPITAL DISCHARGE FOLLOW-UP: Primary | ICD-10-CM

## 2017-10-30 DIAGNOSIS — I10 ESSENTIAL HYPERTENSION, BENIGN: ICD-10-CM

## 2017-10-30 PROCEDURE — 99213 OFFICE O/P EST LOW 20 MIN: CPT | Performed by: FAMILY MEDICINE

## 2017-10-30 NOTE — PATIENT INSTRUCTIONS
Continue to monitor blood pressure, mild elevation could be due to pain. Controlling High Blood Pressure  High blood pressure (hypertension) is often called the silent killer. This is because many people who have it don’t know it.  High blood pressure is · Be active at a moderate to vigorous level of physical activity for at least 40 minutes for a minimum of 3 to 4 days a week.   Manage stress  · Make time to relax and enjoy life. Find time to laugh.   · Communicate your concerns with your loved ones and yo

## 2017-10-30 NOTE — PROGRESS NOTES
Spoke with Daughter about scheduling AWV. Daughter states after she's recovered from recent hospital stay, she will give us a call to schedule.     Last AWV: NONE    [phone] (home)

## 2017-10-30 NOTE — PROGRESS NOTES
Michel Marx is a 78year old female. Patient presents with:  Hospital F/U: Discharged on 10/18/17 for Left knee replacement. Here for f/u. Pt has appt on Friday to get staples removed by ortho. HPI:   Patient is seen for hospital follow up.   Sylvia Kat SYSTEMS:   GENERAL HEALTH: feels well otherwise  SKIN: denies any unusual skin lesions or rashes  RESPIRATORY: denies shortness of breath with exertion  CARDIOVASCULAR: denies chest pain on exertion  GI: denies abdominal pain and denies heartburn  NEURO: d

## 2018-02-01 ENCOUNTER — OFFICE VISIT (OUTPATIENT)
Dept: FAMILY MEDICINE CLINIC | Facility: CLINIC | Age: 68
End: 2018-02-01

## 2018-02-01 VITALS
HEART RATE: 88 BPM | DIASTOLIC BLOOD PRESSURE: 58 MMHG | SYSTOLIC BLOOD PRESSURE: 110 MMHG | BODY MASS INDEX: 29.39 KG/M2 | OXYGEN SATURATION: 98 % | HEIGHT: 57 IN | TEMPERATURE: 99 F | WEIGHT: 136.25 LBS | RESPIRATION RATE: 16 BRPM

## 2018-02-01 DIAGNOSIS — I10 ESSENTIAL HYPERTENSION WITH GOAL BLOOD PRESSURE LESS THAN 130/80: ICD-10-CM

## 2018-02-01 DIAGNOSIS — R73.01 IMPAIRED FASTING GLUCOSE: ICD-10-CM

## 2018-02-01 DIAGNOSIS — Z78.0 POSTMENOPAUSAL: ICD-10-CM

## 2018-02-01 DIAGNOSIS — Z12.31 VISIT FOR SCREENING MAMMOGRAM: ICD-10-CM

## 2018-02-01 DIAGNOSIS — Z13.6 SCREENING FOR CARDIOVASCULAR CONDITION: ICD-10-CM

## 2018-02-01 DIAGNOSIS — Z23 NEED FOR VACCINATION: ICD-10-CM

## 2018-02-01 DIAGNOSIS — Z12.11 SCREENING FOR COLON CANCER: ICD-10-CM

## 2018-02-01 DIAGNOSIS — Z00.00 ENCOUNTER FOR ANNUAL HEALTH EXAMINATION: Primary | ICD-10-CM

## 2018-02-01 PROCEDURE — 90471 IMMUNIZATION ADMIN: CPT | Performed by: FAMILY MEDICINE

## 2018-02-01 PROCEDURE — 99397 PER PM REEVAL EST PAT 65+ YR: CPT | Performed by: FAMILY MEDICINE

## 2018-02-01 PROCEDURE — 99213 OFFICE O/P EST LOW 20 MIN: CPT | Performed by: FAMILY MEDICINE

## 2018-02-01 PROCEDURE — 90670 PCV13 VACCINE IM: CPT | Performed by: FAMILY MEDICINE

## 2018-02-01 PROCEDURE — 90472 IMMUNIZATION ADMIN EACH ADD: CPT | Performed by: FAMILY MEDICINE

## 2018-02-01 PROCEDURE — 90653 IIV ADJUVANT VACCINE IM: CPT | Performed by: FAMILY MEDICINE

## 2018-02-01 RX ORDER — LOSARTAN POTASSIUM AND HYDROCHLOROTHIAZIDE 25; 100 MG/1; MG/1
1 TABLET ORAL DAILY
Qty: 90 TABLET | Refills: 1 | Status: SHIPPED | OUTPATIENT
Start: 2018-02-01 | End: 2018-08-10

## 2018-02-01 NOTE — PATIENT INSTRUCTIONS
Please take vitamin D 1000 IU daily and calcium 600 mg 2 times daily  Please restart aspirin 81 mg daily      Karina Echavarria's SCREENING SCHEDULE   Tests on this list are recommended by your physician but may not be covered, or covered at this frequenc and have smoked more than 100 cigarettes in their lifetime   • Anyone with a family history    Colorectal Cancer Screening  Covered up to Age 76     Colonoscopy Screen   Covered every 10 years- more often if abnormal Colonoscopy,10 Years due on 08/08/2001 FREE INC ANTIG   Orders placed or performed in visit on 11/15/14  -INFLUENZA VIRUS VACCINE, >=1YEARS OF AGE    Please get every year    Pneumococcal 13 (Prevnar)  Covered Once after 65   Orders placed or performed in visit on 02/01/18  -PNEUMOCOCCAL VACC,

## 2018-02-01 NOTE — PROGRESS NOTES
Patient presents with:   Well Adult: AWV  Test Results    HPI:   Kavitha Pinto is a 78year old female who presents for a Medicare Initial Annual Wellness visit (Once after 12 month Medicare anniversary)     Her last annual assessment has been over 1 y screened for Alcohol abuse and had a score of 0 so is at low risk.     Patient Care Team: Patient Care Team:  Lindy Galan MD as PCP - General (Family Medicine)    Patient Active Problem List:     Pain in joint, shoulder region     HTN (hypertension) reports that she has never smoked. She has never used smokeless tobacco. She reports that she does not drink alcohol or use drugs. REVIEW OF SYSTEMS:   Constitutional: no change in weight or appetite. No fever, fatigue or myalgias.   Eye: No change in v nipple inversion or discharge, no axillary adenopathy. LUNGS: clear to auscultation. CARDIO: Regular rate and rhythm without murmur S3 or S4.  GI: no distention, masses, organomegaly or tenderness.   MUSCULOSKELETAL: Back and extremities within normal li activity?: Light  How would you describe your current health state?: Good  How do you maintain positive mental well-being?: Social Interaction; Visiting Family; Visiting Friends;Games;Puzzles      This section provided for quick review of chart, separate she (Update Immunization Activity if applicable)     Influenza  Covered Annually 1/27/2017 Please get every year    Pneumococcal 13 (Prevnar)  Covered Once after 65 No vaccine history found Please get once after your 65th birthday    Pneumococcal 23 (Pneumovax

## 2018-02-08 NOTE — PAYOR COMM NOTE
--------------  CONTINUED STAY REVIEW    Payor: [email or web address]    10/17  POST OP DAY 1             Chief Complaint: TKR     S: Patient has been tachycardic this am with PT OT  Reports pain in her knee  No N/V/Dizziness/CP/SOB    LABS  WBC 10       ASSESS PROGRESS NOTE



DATE OF SERVICE:

02/08/2018



PRESENTING COMPLAINT:

Short of breath.



INTERVAL HISTORY:

This patient presented with bilateral pneumonia; doing much better today.  Patient was

delirious. Status is greatly improved.  The patient actually did eat much better, had a

bowel movement.   is at the bedside.



REVIEW OF SYSTEMS:

Done for constitutional, cardiovascular, GI, pulmonary; relevant findings as above.

Minimal cough. No sputum production.



CURRENT MEDICATIONS:

Current medications are reviewed that include IV Zosyn.



PHYSICAL EXAMINATION:

T-max 101.6 yesterday evening. Today afebrile. Pulse 104, respiration 18, blood

pressure 130/58, pulse ox 93% on 4 L.

GENERAL APPEARANCE: Sitting up. More awake today.

EYES: Pupils equal. Conjunctivae normal.

HEENT: External appearance of nose and ears normal. Oral cavity normal.

NECK: JVD unable to assess. Mass not palpable.

RESPIRATORY: Effort increased.

LUNGS: Diminished breath sounds.

CARDIOVASCULAR: First and second sounds normal. Chronic edema.

ABDOMEN: Soft, non-tender. Liver and spleen not palpable.

PSYCHIATRY: Alert and oriented x3. Mood and affect normal.



INVESTIGATIONS:

Accu-Cheks noted.



ASSESSMENT:

1. Multilobar bilateral pneumonia. Suspect Gram-negative organism causing sepsis,

    present on admission, with clinical improvement.

2. Acute delirium from above with clinical improvement.

3. Chronic sigmoid diverticulosis.

4. Chronic hypoxic respiratory failure, on 3 liters of oxygen at home from underlying

    chronic obstructive pulmonary disease.

5. Acute chronic obstructive pulmonary disease exacerbation in an ex-smoker.

6. Obesity hypoventilation syndrome.

7. Cor pulmonale from obesity hypoventilation syndrome and chronic obstructive

    pulmonary disease and chronic thromboembolic disease.

8. Diabetes mellitus, type 2, chronically on insulin.

9. 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

    Xarelto.

10.Chronic low back pain from spinal stenosis.

11.Chronic bilateral lower extremity lymphedema.

12.Chronic urinary stress incontinence.

13.Gait dysfunction; uses a walker.

14.Chronic restless leg syndrome.

15.History of splenectomy.

16.Chronic diverticulosis.



PLAN:

Will keep the patient on IV Zosyn today.  Other medication and treatment plan is to

continue.  Check labs in the morning.



ADVANCED CARE PLANNING: End-of-life care was discussed with the patient and her

. I discussed her conditions. The patient has agreed to proceed to become DO NOT

RESUSCITATE.  She understands her condition, that if her heart or lungs were to stop,

she does not wish to be on a ventilator or resuscitated.



This discussion in addition took about 20 minutes.





CYNTHIA / MADELEINE: [id number] / Job#: [id number]

## 2018-02-21 LAB — HEMOGLOBIN A1C: 5.3 % OF TOTAL HGB

## 2018-02-28 ENCOUNTER — TELEPHONE (OUTPATIENT)
Dept: FAMILY MEDICINE CLINIC | Facility: CLINIC | Age: 68
End: 2018-02-28

## 2018-02-28 NOTE — TELEPHONE ENCOUNTER
DEXA consistent with osteopenia, recommend calcium 600 mg twice daily and vitamin D 6185-4561 IU daily.

## 2018-03-05 ENCOUNTER — TELEPHONE (OUTPATIENT)
Dept: FAMILY MEDICINE CLINIC | Facility: CLINIC | Age: 68
End: 2018-03-05

## 2018-03-05 NOTE — TELEPHONE ENCOUNTER
Pt is questioning how much vitamin D she should be taking? Called the pharmacy and they don't have anything. I asked if they were suppose to take daily or weekly pill?  They didn't know they said they were told weeks ago to take Vit D.

## 2018-03-05 NOTE — TELEPHONE ENCOUNTER
Patient is to do OTC CA with Vitamin D    Katherin Chopra MD          11:56 AM   Note      DEXA consistent with osteopenia, recommend calcium 600 mg twice daily and vitamin D 5466-3001 IU daily. Spoke to patient and gave detailed information.

## 2018-08-01 ENCOUNTER — MED REC SCAN ONLY (OUTPATIENT)
Dept: FAMILY MEDICINE CLINIC | Facility: CLINIC | Age: 68
End: 2018-08-01

## 2018-08-10 ENCOUNTER — OFFICE VISIT (OUTPATIENT)
Dept: FAMILY MEDICINE CLINIC | Facility: CLINIC | Age: 68
End: 2018-08-10
Payer: MEDICARE

## 2018-08-10 VITALS
DIASTOLIC BLOOD PRESSURE: 64 MMHG | RESPIRATION RATE: 16 BRPM | HEIGHT: 57 IN | OXYGEN SATURATION: 99 % | HEART RATE: 80 BPM | WEIGHT: 138.13 LBS | SYSTOLIC BLOOD PRESSURE: 122 MMHG | BODY MASS INDEX: 29.8 KG/M2 | TEMPERATURE: 98 F

## 2018-08-10 DIAGNOSIS — Z02.89 ENCOUNTER FOR COMPLETION OF FORM WITH PATIENT: ICD-10-CM

## 2018-08-10 DIAGNOSIS — Z79.899 ENCOUNTER FOR LONG-TERM CURRENT USE OF MEDICATION: Primary | ICD-10-CM

## 2018-08-10 DIAGNOSIS — I10 ESSENTIAL HYPERTENSION WITH GOAL BLOOD PRESSURE LESS THAN 130/80: ICD-10-CM

## 2018-08-10 PROCEDURE — 99213 OFFICE O/P EST LOW 20 MIN: CPT | Performed by: FAMILY MEDICINE

## 2018-08-10 RX ORDER — LOSARTAN POTASSIUM AND HYDROCHLOROTHIAZIDE 25; 100 MG/1; MG/1
1 TABLET ORAL DAILY
Qty: 90 TABLET | Refills: 1 | Status: SHIPPED | OUTPATIENT
Start: 2018-08-10 | End: 2019-02-14

## 2018-08-10 NOTE — PROGRESS NOTES
oMny Flanagan is a 80year old female. Patient presents with:  Medication Follow-Up: Refill Losartan 6 month f/u.     HPI:   HTN:Patient is seen for follow up and medication refill, states compliance with medication and diet, has been walking for exer METABOLIC PANEL (14); Future  -     LIPID PANEL; Future  -     COMP METABOLIC PANEL (14)  -     LIPID PANEL    Encounter for completion of form with patient  Permanent disability placard form filled and given to patient.

## 2019-02-14 ENCOUNTER — OFFICE VISIT (OUTPATIENT)
Dept: FAMILY MEDICINE CLINIC | Facility: CLINIC | Age: 69
End: 2019-02-14
Payer: COMMERCIAL

## 2019-02-14 VITALS
BODY MASS INDEX: 30.47 KG/M2 | WEIGHT: 141.25 LBS | DIASTOLIC BLOOD PRESSURE: 62 MMHG | TEMPERATURE: 98 F | HEART RATE: 82 BPM | HEIGHT: 57 IN | SYSTOLIC BLOOD PRESSURE: 110 MMHG | RESPIRATION RATE: 14 BRPM | OXYGEN SATURATION: 98 %

## 2019-02-14 DIAGNOSIS — Z23 NEED FOR VACCINATION: ICD-10-CM

## 2019-02-14 DIAGNOSIS — Z00.00 ROUTINE GENERAL MEDICAL EXAMINATION AT A HEALTH CARE FACILITY: Primary | ICD-10-CM

## 2019-02-14 DIAGNOSIS — Z12.39 SCREENING FOR BREAST CANCER: ICD-10-CM

## 2019-02-14 DIAGNOSIS — Z78.0 POSTMENOPAUSAL: ICD-10-CM

## 2019-02-14 DIAGNOSIS — I10 ESSENTIAL HYPERTENSION WITH GOAL BLOOD PRESSURE LESS THAN 130/80: ICD-10-CM

## 2019-02-14 DIAGNOSIS — Z12.11 SCREENING FOR COLON CANCER: ICD-10-CM

## 2019-02-14 PROCEDURE — 90653 IIV ADJUVANT VACCINE IM: CPT | Performed by: FAMILY MEDICINE

## 2019-02-14 PROCEDURE — 90471 IMMUNIZATION ADMIN: CPT | Performed by: FAMILY MEDICINE

## 2019-02-14 PROCEDURE — 99397 PER PM REEVAL EST PAT 65+ YR: CPT | Performed by: FAMILY MEDICINE

## 2019-02-14 RX ORDER — LOSARTAN POTASSIUM AND HYDROCHLOROTHIAZIDE 25; 100 MG/1; MG/1
1 TABLET ORAL DAILY
Qty: 90 TABLET | Refills: 1 | Status: SHIPPED | OUTPATIENT
Start: 2019-02-14 | End: 2019-07-11

## 2019-02-14 NOTE — PROGRESS NOTES
Magdalene Melvin is a 80year old female here for Patient presents with:   Well Adult: Physical    HPI:   Patient is seen for routine annual physical.  Mammogram done last year was normal.    HTN: well controlled, needs medication refilled, denies any griselda earache or change in hearing. No nasal congestion, allergies, sinus pain, nosebleed or sore throat. Heme/lymph: No unusual bleeding or bruising, no lymph node enlargement or tenderness. Endocrine: No thyroid symptoms. No symptoms of diabetes.   Respirato equal bilaterally. ASSESSMENT AND PLAN:   Gissell Trejo was seen today for well adult.     Diagnoses and all orders for this visit:    Routine general medical examination at a health care facility  -     ASSAY, THYROID STIM HORMONE; Future  -     LIPID PANEL;

## 2019-02-14 NOTE — PATIENT INSTRUCTIONS
Prevention Guidelines, Women Ages 72 and Older  Screening tests and vaccines are an important part of managing your health. A screening test is done to find possible disorders or diseases in people who don't have any symptoms.  The goal is to find a disea This screening is advised against for women over 75 or if there is a life expectancy of less than 10 years.  Multiple tests are available and are used at different times.  Possible tests include: flexible sigmoidoscopy, colonoscopy, double-contrast barium e Vision All women in this age group Every 1 to 2 years; if you have a chronic health condition, ask your healthcare provider if you need exams more often   Vaccine Who needs it How often   Chickenpox (varicella) All women in this age group who have no recor Use of daily aspirin Talk to your healthcare provider about whether or not to start taking low-dose aspirin for the prevention of cardiovascular disease (CVD) and colorectal cancer in adults ages 61 to 71 who have at least a 10% risk of getting CVD within

## 2019-02-19 LAB
ABSOLUTE BASOPHILS: 29 CELLS/UL (ref 0–200)
ABSOLUTE EOSINOPHILS: 386 CELLS/UL (ref 15–500)
ABSOLUTE LYMPHOCYTES: 1634 CELLS/UL (ref 850–3900)
ABSOLUTE MONOCYTES: 424 CELLS/UL (ref 200–950)
ABSOLUTE NEUTROPHILS: 1726 CELLS/UL (ref 1500–7800)
ALBUMIN/GLOBULIN RATIO: 1.5 (CALC) (ref 1–2.5)
ALBUMIN: 4.2 G/DL (ref 3.6–5.1)
ALKALINE PHOSPHATASE: 64 U/L (ref 33–130)
ALT: 14 U/L (ref 6–29)
AST: 19 U/L (ref 10–35)
BASOPHILS: 0.7 %
BILIRUBIN, TOTAL: 0.7 MG/DL (ref 0.2–1.2)
BUN: 13 MG/DL (ref 7–25)
CALCIUM: 9.2 MG/DL (ref 8.6–10.4)
CARBON DIOXIDE: 29 MMOL/L (ref 20–32)
CHLORIDE: 104 MMOL/L (ref 98–110)
CHOL/HDLC RATIO: 3.1 (CALC)
CHOLESTEROL, TOTAL: 186 MG/DL
CREATININE: 0.67 MG/DL (ref 0.5–0.99)
EGFR IF AFRICN AM: 105 ML/MIN/1.73M2
EGFR IF NONAFRICN AM: 91 ML/MIN/1.73M2
EOSINOPHILS: 9.2 %
GLOBULIN: 2.8 G/DL (CALC) (ref 1.9–3.7)
GLUCOSE: 88 MG/DL (ref 65–99)
HDL CHOLESTEROL: 60 MG/DL
HEMATOCRIT: 33.8 % (ref 35–45)
HEMOGLOBIN: 11.4 G/DL (ref 11.7–15.5)
LDL-CHOLESTEROL: 107 MG/DL (CALC)
LYMPHOCYTES: 38.9 %
MCH: 29.2 PG (ref 27–33)
MCHC: 33.7 G/DL (ref 32–36)
MCV: 86.4 FL (ref 80–100)
MONOCYTES: 10.1 %
MPV: 11 FL (ref 7.5–12.5)
NEUTROPHILS: 41.1 %
NON-HDL CHOLESTEROL: 126 MG/DL (CALC)
PLATELET COUNT: 222 THOUSAND/UL (ref 140–400)
POTASSIUM: 4 MMOL/L (ref 3.5–5.3)
PROTEIN, TOTAL: 7 G/DL (ref 6.1–8.1)
RDW: 12.4 % (ref 11–15)
RED BLOOD CELL COUNT: 3.91 MILLION/UL (ref 3.8–5.1)
SODIUM: 140 MMOL/L (ref 135–146)
TRIGLYCERIDES: 98 MG/DL
TSH: 2.77 MIU/L (ref 0.4–4.5)
WHITE BLOOD CELL COUNT: 4.2 THOUSAND/UL (ref 3.8–10.8)

## 2019-03-18 ENCOUNTER — TELEPHONE (OUTPATIENT)
Dept: FAMILY MEDICINE CLINIC | Facility: CLINIC | Age: 69
End: 2019-03-18

## 2019-03-18 NOTE — TELEPHONE ENCOUNTER
Bone density consistent osteopenia, lumbar bone density has improved from 2 years ago and hip and femoral neck slightly decreased for the last DEXA.

## 2019-04-06 ENCOUNTER — TELEPHONE (OUTPATIENT)
Dept: FAMILY MEDICINE CLINIC | Facility: CLINIC | Age: 69
End: 2019-04-06

## 2019-04-06 NOTE — TELEPHONE ENCOUNTER
DEXA consistent with osteopenia, patient to continue taking calcium, vitamin D and also continue resistance exercises.

## 2019-04-23 ENCOUNTER — TELEPHONE (OUTPATIENT)
Dept: FAMILY MEDICINE CLINIC | Facility: CLINIC | Age: 69
End: 2019-04-23

## 2019-04-23 NOTE — TELEPHONE ENCOUNTER
Pt's  left a voicemail which you could not understand. Returned call to see what was needed. Still had a hard time understanding. (blood test results? ? )

## 2019-04-23 NOTE — TELEPHONE ENCOUNTER
Pt received overdue results letter    All testing was completed    Apologized to , advised ok to disregard this letter.

## 2019-07-11 ENCOUNTER — OFFICE VISIT (OUTPATIENT)
Dept: FAMILY MEDICINE CLINIC | Facility: CLINIC | Age: 69
End: 2019-07-11
Payer: COMMERCIAL

## 2019-07-11 VITALS
DIASTOLIC BLOOD PRESSURE: 52 MMHG | RESPIRATION RATE: 16 BRPM | BODY MASS INDEX: 31.31 KG/M2 | OXYGEN SATURATION: 97 % | WEIGHT: 145.13 LBS | HEIGHT: 57 IN | HEART RATE: 78 BPM | TEMPERATURE: 97 F | SYSTOLIC BLOOD PRESSURE: 98 MMHG

## 2019-07-11 DIAGNOSIS — G89.29 CHRONIC BILATERAL LOW BACK PAIN WITHOUT SCIATICA: Primary | ICD-10-CM

## 2019-07-11 DIAGNOSIS — Z96.652 STATUS POST TOTAL LEFT KNEE REPLACEMENT: ICD-10-CM

## 2019-07-11 DIAGNOSIS — I10 ESSENTIAL HYPERTENSION WITH GOAL BLOOD PRESSURE LESS THAN 130/80: ICD-10-CM

## 2019-07-11 DIAGNOSIS — Z23 NEED FOR VACCINATION: ICD-10-CM

## 2019-07-11 DIAGNOSIS — M54.50 CHRONIC BILATERAL LOW BACK PAIN WITHOUT SCIATICA: Primary | ICD-10-CM

## 2019-07-11 PROCEDURE — 90471 IMMUNIZATION ADMIN: CPT | Performed by: FAMILY MEDICINE

## 2019-07-11 PROCEDURE — 90732 PPSV23 VACC 2 YRS+ SUBQ/IM: CPT | Performed by: FAMILY MEDICINE

## 2019-07-11 PROCEDURE — 99213 OFFICE O/P EST LOW 20 MIN: CPT | Performed by: FAMILY MEDICINE

## 2019-07-11 RX ORDER — LOSARTAN POTASSIUM AND HYDROCHLOROTHIAZIDE 25; 100 MG/1; MG/1
1 TABLET ORAL DAILY
Qty: 90 TABLET | Refills: 1 | Status: SHIPPED | OUTPATIENT
Start: 2019-07-11 | End: 2019-12-11 | Stop reason: RX

## 2019-07-11 NOTE — PROGRESS NOTES
Kavon Richardson is a 80year old female. Patient presents with: Other: Patient needing a prescription for a shower chair.     HPI:   HTN:Patient is seen for follow up and medication refill, states compliance with medication and diet, has been walking f Karina was seen today for other. Diagnoses and all orders for this visit:    Chronic bilateral low back pain without sciatica  -     Misc.  Devices (BATH/SHOWER SEAT) Does not apply Misc; Chair, shower, w/back, knock dwn, 250 lb cap    Essential hype

## 2019-09-09 ENCOUNTER — OFFICE VISIT (OUTPATIENT)
Dept: FAMILY MEDICINE CLINIC | Facility: CLINIC | Age: 69
End: 2019-09-09
Payer: COMMERCIAL

## 2019-09-09 VITALS
TEMPERATURE: 98 F | SYSTOLIC BLOOD PRESSURE: 122 MMHG | HEART RATE: 81 BPM | DIASTOLIC BLOOD PRESSURE: 68 MMHG | BODY MASS INDEX: 31.15 KG/M2 | RESPIRATION RATE: 16 BRPM | WEIGHT: 144.38 LBS | HEIGHT: 57 IN | OXYGEN SATURATION: 100 %

## 2019-09-09 DIAGNOSIS — M25.512 ACUTE PAIN OF LEFT SHOULDER: Primary | ICD-10-CM

## 2019-09-09 DIAGNOSIS — M75.02 ADHESIVE CAPSULITIS OF LEFT SHOULDER: ICD-10-CM

## 2019-09-09 DIAGNOSIS — Z23 NEED FOR VACCINATION: ICD-10-CM

## 2019-09-09 PROCEDURE — 99213 OFFICE O/P EST LOW 20 MIN: CPT | Performed by: FAMILY MEDICINE

## 2019-09-09 RX ORDER — NABUMETONE 500 MG/1
500 TABLET, FILM COATED ORAL 2 TIMES DAILY
Qty: 30 TABLET | Refills: 0 | Status: SHIPPED | OUTPATIENT
Start: 2019-09-09 | End: 2019-10-03

## 2019-09-09 NOTE — PROGRESS NOTES
Jesusita Cedeno is a 77year old female.   Patient presents with:  Shoulder Pain: left   Infection: flu shot     HPI:   Patient complaining of worsening left shoulder pain for the past 2 weeks, states has some chronic pain and ROM is very limited in the crepitus, ROM is limited. ASSESSMENT AND PLAN:   Melissa Anderson was seen today for shoulder pain and infection. Diagnoses and all orders for this visit:    Acute pain of left shoulder  -     PHYSICAL THERAPY EXTERNAL  -     Nabumetone 500 MG Oral Tab;  Take

## 2019-09-09 NOTE — PATIENT INSTRUCTIONS
Understanding Frozen Shoulder    Frozen shoulder is a condition where the shoulder becomes painful and hard to move. The condition is sometimes called adhesive capsulitis. The shoulder is a joint that is made up of many parts.  These parts allow you to r Most cases of frozen shoulder get better, even with no treatment. Treatment is done to help speed healing and help regain as much joint movement as possible. The best course of treatment will depend on your needs.  It may include one or more of the followin

## 2019-09-26 DIAGNOSIS — M25.512 ACUTE PAIN OF LEFT SHOULDER: ICD-10-CM

## 2019-09-26 DIAGNOSIS — M75.02 ADHESIVE CAPSULITIS OF LEFT SHOULDER: ICD-10-CM

## 2019-09-27 NOTE — TELEPHONE ENCOUNTER
LOV     Return in about 2 weeks (around 2019      LAST LAB     LAST RX   Nabumetone 500 MG Oral Tab () 30 tablet 0 2019   Sig:   Take 1 tablet (500 mg total) by mouth 2 (two) times daily for 15 days.            Next OV Vis

## 2019-10-01 RX ORDER — NABUMETONE 500 MG/1
TABLET, FILM COATED ORAL
Qty: 30 TABLET | Refills: 0 | OUTPATIENT
Start: 2019-10-01

## 2019-10-01 NOTE — TELEPHONE ENCOUNTER
Left detailed message for Pt to call back if still wanting this medication  Needs additional OV if symptoms persist

## 2019-10-02 ENCOUNTER — TELEPHONE (OUTPATIENT)
Dept: FAMILY MEDICINE CLINIC | Facility: CLINIC | Age: 69
End: 2019-10-02

## 2019-10-02 ENCOUNTER — HOSPITAL ENCOUNTER (OUTPATIENT)
Dept: GENERAL RADIOLOGY | Age: 69
Discharge: HOME | End: 2019-10-02
Attending: FAMILY MEDICINE
Payer: MEDICARE

## 2019-10-02 DIAGNOSIS — M75.02 ADHESIVE CAPSULITIS OF LEFT SHOULDER: ICD-10-CM

## 2019-10-02 DIAGNOSIS — M25.512 ACUTE PAIN OF LEFT SHOULDER: ICD-10-CM

## 2019-10-02 PROCEDURE — 73030 X-RAY EXAM OF SHOULDER: CPT | Performed by: FAMILY MEDICINE

## 2019-10-02 NOTE — TELEPHONE ENCOUNTER
Returned call to [facility]. Aparna Wheat, therapist, is not working today. They said patient he was calling about is [employer]. Advised to have him return call tomorrow.

## 2019-10-02 NOTE — TELEPHONE ENCOUNTER
Physical Therapist, Jelena Fonseca, [facility] requesting to speak with Dr. Neville Allen regarding mutual patient. Asked for call back.

## 2019-10-03 NOTE — TELEPHONE ENCOUNTER
Spoke with PT, Erskin Dubin, from [facility]. States he believes pt may have more than \"frozen shoulder\". On his exam he noted UNLIMITED passive mobility. Shoulder can't be stabilized with Physical Therapy.     Advised based on XR results, we are referring patient t talk about possible debridement of the shoulder. She will return to clinic in six weeks.

## 2019-10-03 NOTE — TELEPHONE ENCOUNTER
----- Message from Chel Hills MD sent at 10/2/2019  4:47 PM CDT -----  Anterior glenohumeral joint dislocation, erosion of the humeral head with remodeling of glenoid fossa, chronic inflammatory arthropathy.  Please refer patient to ortho, has barbara D

## 2019-10-03 NOTE — TELEPHONE ENCOUNTER
Called patient's  and advised current POC is pain management. States patient is doing OK right now but will need refill on Nabumetone. States the new medication seem to work the best.  Refill encounter pending from 9/26/19.

## 2019-10-03 NOTE — TELEPHONE ENCOUNTER
Spoke to patient's . States his wife does need this refill for left shoulder pain management.   States this medication works the best.    Dr. Sacha Simon,  [id number]  (see TE 10/2/19)  Order pending,  Please review and complete at appropriate

## 2019-10-04 RX ORDER — NABUMETONE 500 MG/1
500 TABLET, FILM COATED ORAL 2 TIMES DAILY PRN
Qty: 60 TABLET | Refills: 3 | Status: SHIPPED | OUTPATIENT
Start: 2019-10-04 | End: 2019-12-30

## 2019-11-08 ENCOUNTER — TELEPHONE (OUTPATIENT)
Dept: FAMILY MEDICINE CLINIC | Facility: CLINIC | Age: 69
End: 2019-11-08

## 2019-11-08 NOTE — TELEPHONE ENCOUNTER
Pt was told by pharmacy her BP meds Losartan were re-called, she is out of medication and needs new Rx sent to pharmacy

## 2019-11-09 NOTE — TELEPHONE ENCOUNTER
Called pharmacy to see if individual meds available. They state they were able to fill patient's losartan-HCTZ with a different . It is ready for her to . Called and spoke to patient. Informed rx is ready for .

## 2019-12-09 ENCOUNTER — TELEPHONE (OUTPATIENT)
Dept: FAMILY MEDICINE CLINIC | Facility: CLINIC | Age: 69
End: 2019-12-09

## 2019-12-11 ENCOUNTER — TELEPHONE (OUTPATIENT)
Dept: FAMILY MEDICINE CLINIC | Facility: CLINIC | Age: 69
End: 2019-12-11

## 2019-12-11 RX ORDER — LOSARTAN POTASSIUM 100 MG/1
100 TABLET ORAL DAILY
Qty: 90 TABLET | Refills: 0 | Status: SHIPPED | OUTPATIENT
Start: 2019-12-11 | End: 2019-12-30

## 2019-12-11 RX ORDER — HYDROCHLOROTHIAZIDE 25 MG/1
25 TABLET ORAL DAILY
Qty: 90 TABLET | Refills: 0 | Status: SHIPPED | OUTPATIENT
Start: 2019-12-11 | End: 2019-12-30

## 2019-12-30 ENCOUNTER — OFFICE VISIT (OUTPATIENT)
Dept: FAMILY MEDICINE CLINIC | Facility: CLINIC | Age: 69
End: 2019-12-30
Payer: COMMERCIAL

## 2019-12-30 VITALS
RESPIRATION RATE: 18 BRPM | WEIGHT: 143 LBS | DIASTOLIC BLOOD PRESSURE: 68 MMHG | BODY MASS INDEX: 30.85 KG/M2 | HEART RATE: 91 BPM | OXYGEN SATURATION: 99 % | TEMPERATURE: 97 F | HEIGHT: 57 IN | SYSTOLIC BLOOD PRESSURE: 116 MMHG

## 2019-12-30 DIAGNOSIS — R05.9 COUGH: ICD-10-CM

## 2019-12-30 DIAGNOSIS — I10 ESSENTIAL HYPERTENSION: Primary | ICD-10-CM

## 2019-12-30 DIAGNOSIS — M19.012 PRIMARY OSTEOARTHRITIS OF LEFT SHOULDER: ICD-10-CM

## 2019-12-30 PROCEDURE — 99214 OFFICE O/P EST MOD 30 MIN: CPT | Performed by: FAMILY MEDICINE

## 2019-12-30 RX ORDER — BENZONATATE 200 MG/1
200 CAPSULE ORAL 3 TIMES DAILY PRN
Qty: 21 CAPSULE | Refills: 0 | Status: SHIPPED | OUTPATIENT
Start: 2019-12-30 | End: 2021-01-11

## 2019-12-30 RX ORDER — TRAMADOL HYDROCHLORIDE 50 MG/1
50 TABLET ORAL 2 TIMES DAILY PRN
Qty: 60 TABLET | Refills: 2 | Status: SHIPPED | OUTPATIENT
Start: 2019-12-30 | End: 2020-05-22

## 2019-12-30 RX ORDER — NABUMETONE 500 MG/1
500 TABLET, FILM COATED ORAL 2 TIMES DAILY PRN
Qty: 60 TABLET | Refills: 3 | Status: SHIPPED | OUTPATIENT
Start: 2019-12-30 | End: 2020-08-24

## 2019-12-30 RX ORDER — HYDROCHLOROTHIAZIDE 25 MG/1
25 TABLET ORAL DAILY
Qty: 90 TABLET | Refills: 0 | Status: SHIPPED | OUTPATIENT
Start: 2019-12-30 | End: 2020-05-22

## 2019-12-30 RX ORDER — LOSARTAN POTASSIUM 100 MG/1
100 TABLET ORAL DAILY
Qty: 90 TABLET | Refills: 0 | Status: SHIPPED | OUTPATIENT
Start: 2019-12-30 | End: 2020-05-22

## 2019-12-30 RX ORDER — ALBUTEROL SULFATE 90 UG/1
2 AEROSOL, METERED RESPIRATORY (INHALATION) EVERY 4 HOURS PRN
Qty: 1 INHALER | Refills: 0 | Status: SHIPPED | OUTPATIENT
Start: 2019-12-30 | End: 2020-01-13

## 2019-12-30 NOTE — PROGRESS NOTES
Jae Winn is a 77year old female.   Patient presents with:  Hypertension: f/u on meds    HPI:   HTN: Patient is seen for follow-up and medication refill, states has been compliant with medication and low-salt diet, blood pressure is well controlle cervical spine    • High blood pressure    • HTN (hypertension)    • Neuropathic arthritis     Left, SEES DR. LEE AT [facility]   • Osteoarthritis    • Osteoarthritis of left wrist    • Pain in joint, shoulder region    • Seizure disorder ([facility] [address]     had 1 15- 2 (two) times daily as needed for Pain.   Advised patient if her pain is not controlled with the tramadol and nabumetone combination will refer her to pain management, advised  to call and follow-up with Ortho again for possible steroid injection as

## 2019-12-30 NOTE — PATIENT INSTRUCTIONS
If cough is not better in 2 weeks please follow up. Chronic cough can be due to acid reflux, allergies, post viral syndrome. Please take pain medication as prescribed.  Please follow up with ortho to discuss joint injection if you do not want to go for s Put the spacer between your teeth and close your lips tightly around the spacer. · Spray 1 puff into the spacer by pressing down on the inhaler. Then slowly breathe in as deeply as you can.  If you breathe in too quickly, you may hear a whistling sound in

## 2020-05-18 DIAGNOSIS — I10 ESSENTIAL HYPERTENSION: ICD-10-CM

## 2020-05-18 NOTE — TELEPHONE ENCOUNTER
LOV 12/30/2019    LAST LAB 2-18-19     LAST RX 12-30-19 90*0 FOR BOTH MEDICATIONS    Next OV No future appointments.     PROTOCOL    Name from pharmacy: LOSARTAN 100MG TABLETS          Will file in chart as: LOSARTAN 100 MG Oral Tab         Sig: TAKE 1 TABL

## 2020-05-21 DIAGNOSIS — I10 ESSENTIAL HYPERTENSION: ICD-10-CM

## 2020-05-21 RX ORDER — HYDROCHLOROTHIAZIDE 25 MG/1
TABLET ORAL
Qty: 90 TABLET | Refills: 0 | OUTPATIENT
Start: 2020-05-21

## 2020-05-21 RX ORDER — LOSARTAN POTASSIUM 100 MG/1
TABLET ORAL
Qty: 90 TABLET | Refills: 0 | OUTPATIENT
Start: 2020-05-21

## 2020-05-21 NOTE — TELEPHONE ENCOUNTER
LOV 12/30/2019    LAST LAB    LAST RX    Next OV   Future Appointments   Date Time Provider Graciela Love   5/22/2020  9:40 AM Meri Cisse MD [facility]         PROTOCOL     Name from pharmacy: LOSARTAN 100MG TABLETS         Will file in ch

## 2020-05-22 ENCOUNTER — TELEMEDICINE (OUTPATIENT)
Dept: FAMILY MEDICINE CLINIC | Facility: CLINIC | Age: 70
End: 2020-05-22

## 2020-05-22 VITALS — SYSTOLIC BLOOD PRESSURE: 125 MMHG | WEIGHT: 140 LBS | DIASTOLIC BLOOD PRESSURE: 73 MMHG | BODY MASS INDEX: 30 KG/M2

## 2020-05-22 DIAGNOSIS — M25.511 PAIN IN JOINT OF RIGHT SHOULDER: ICD-10-CM

## 2020-05-22 DIAGNOSIS — Z13.29 SCREENING FOR THYROID DISORDER: ICD-10-CM

## 2020-05-22 DIAGNOSIS — I10 ESSENTIAL HYPERTENSION: Primary | ICD-10-CM

## 2020-05-22 DIAGNOSIS — Z13.1 SCREENING FOR DIABETES MELLITUS: ICD-10-CM

## 2020-05-22 DIAGNOSIS — Z13.0 SCREENING FOR DEFICIENCY ANEMIA: ICD-10-CM

## 2020-05-22 DIAGNOSIS — M19.012 PRIMARY OSTEOARTHRITIS OF LEFT SHOULDER: ICD-10-CM

## 2020-05-22 PROCEDURE — 99213 OFFICE O/P EST LOW 20 MIN: CPT | Performed by: FAMILY MEDICINE

## 2020-05-22 PROCEDURE — 3074F SYST BP LT 130 MM HG: CPT | Performed by: FAMILY MEDICINE

## 2020-05-22 PROCEDURE — 3078F DIAST BP <80 MM HG: CPT | Performed by: FAMILY MEDICINE

## 2020-05-22 RX ORDER — HYDROCHLOROTHIAZIDE 25 MG/1
25 TABLET ORAL DAILY
Qty: 90 TABLET | Refills: 0 | Status: SHIPPED | OUTPATIENT
Start: 2020-05-22 | End: 2020-08-11

## 2020-05-22 RX ORDER — TRAMADOL HYDROCHLORIDE 50 MG/1
50 TABLET ORAL 2 TIMES DAILY PRN
Qty: 60 TABLET | Refills: 2 | Status: SHIPPED | OUTPATIENT
Start: 2020-05-22 | End: 2020-06-21

## 2020-05-22 RX ORDER — LOSARTAN POTASSIUM 100 MG/1
TABLET ORAL
Qty: 90 TABLET | Refills: 0 | OUTPATIENT
Start: 2020-05-22

## 2020-05-22 RX ORDER — NABUMETONE 500 MG/1
500 TABLET, FILM COATED ORAL 2 TIMES DAILY PRN
Qty: 60 TABLET | Refills: 2 | Status: SHIPPED | OUTPATIENT
Start: 2020-05-22 | End: 2020-06-21

## 2020-05-22 RX ORDER — HYDROCHLOROTHIAZIDE 25 MG/1
TABLET ORAL
Qty: 90 TABLET | Refills: 0 | OUTPATIENT
Start: 2020-05-22

## 2020-05-22 RX ORDER — LOSARTAN POTASSIUM 100 MG/1
100 TABLET ORAL DAILY
Qty: 90 TABLET | Refills: 0 | Status: SHIPPED | OUTPATIENT
Start: 2020-05-22 | End: 2020-08-11

## 2020-05-22 NOTE — PROGRESS NOTES
Blake Mccracken verbally consents to a Virtual/Telephone Check-In visit on 05/22/20. Patient has been referred to the [facility] website at [email or web address] to review the yearly Consent to Treat document.     Patient understands and accepts financ aspirin 81 MG Oral Chew Tab Chew by mouth daily. Allergies:No Known Allergies   PHYSICAL EXAM:   Physical Exam           ASSESSMENT/PLAN:   Yonatan Rodney was seen today for hypertension.     Diagnoses and all orders for this visit:    Essential hypertensio

## 2020-08-10 DIAGNOSIS — I10 ESSENTIAL HYPERTENSION: ICD-10-CM

## 2020-08-11 DIAGNOSIS — I10 ESSENTIAL HYPERTENSION: ICD-10-CM

## 2020-08-11 RX ORDER — HYDROCHLOROTHIAZIDE 25 MG/1
TABLET ORAL
Qty: 90 TABLET | Refills: 0 | OUTPATIENT
Start: 2020-08-11

## 2020-08-11 RX ORDER — HYDROCHLOROTHIAZIDE 25 MG/1
TABLET ORAL
Qty: 30 TABLET | Refills: 0 | Status: SHIPPED | OUTPATIENT
Start: 2020-08-11 | End: 2020-08-24

## 2020-08-11 RX ORDER — LOSARTAN POTASSIUM 100 MG/1
TABLET ORAL
Qty: 90 TABLET | Refills: 0 | OUTPATIENT
Start: 2020-08-11

## 2020-08-11 RX ORDER — LOSARTAN POTASSIUM 100 MG/1
TABLET ORAL
Qty: 30 TABLET | Refills: 0 | Status: SHIPPED | OUTPATIENT
Start: 2020-08-11 | End: 2020-08-24

## 2020-08-11 NOTE — TELEPHONE ENCOUNTER
LOV  12/30/2019    LAST LAB  6-4-20    LAST RX     Next OV   Future Appointments   Date Time Provider Graciela Love   8/24/2020  9:40 AM Mary Rodriguez MD [facility]         PROTOCOL    Name from pharmacy: LOSARTAN 100MG TABLETS          Will

## 2020-08-11 NOTE — TELEPHONE ENCOUNTER
LOV 12/30/2019    LAST LAB 6-4-20    LAST RX    Next OV   Future Appointments   Date Time Provider Graciela Love   8/24/2020  9:40 AM Leif Wilkes MD [facility]         PROTOCOL    Name from pharmacy: HYDROCHLOROTHIAZIDE 25MG TABLETS

## 2020-08-24 ENCOUNTER — OFFICE VISIT (OUTPATIENT)
Dept: FAMILY MEDICINE CLINIC | Facility: CLINIC | Age: 70
End: 2020-08-24
Payer: MEDICARE

## 2020-08-24 VITALS
TEMPERATURE: 98 F | HEIGHT: 57 IN | RESPIRATION RATE: 18 BRPM | SYSTOLIC BLOOD PRESSURE: 110 MMHG | OXYGEN SATURATION: 98 % | HEART RATE: 82 BPM | DIASTOLIC BLOOD PRESSURE: 72 MMHG | BODY MASS INDEX: 31.93 KG/M2 | WEIGHT: 148 LBS

## 2020-08-24 DIAGNOSIS — Z00.00 ROUTINE GENERAL MEDICAL EXAMINATION AT A HEALTH CARE FACILITY: Primary | ICD-10-CM

## 2020-08-24 DIAGNOSIS — Z12.31 ENCOUNTER FOR SCREENING MAMMOGRAM FOR BREAST CANCER: ICD-10-CM

## 2020-08-24 DIAGNOSIS — I10 ESSENTIAL HYPERTENSION: ICD-10-CM

## 2020-08-24 DIAGNOSIS — Z01.84 IMMUNITY STATUS TESTING: ICD-10-CM

## 2020-08-24 DIAGNOSIS — M19.012 PRIMARY OSTEOARTHRITIS OF LEFT SHOULDER: ICD-10-CM

## 2020-08-24 DIAGNOSIS — E66.9 OBESITY (BMI 30.0-34.9): ICD-10-CM

## 2020-08-24 DIAGNOSIS — E78.00 PURE HYPERCHOLESTEROLEMIA: ICD-10-CM

## 2020-08-24 PROCEDURE — 3078F DIAST BP <80 MM HG: CPT | Performed by: FAMILY MEDICINE

## 2020-08-24 PROCEDURE — 3074F SYST BP LT 130 MM HG: CPT | Performed by: FAMILY MEDICINE

## 2020-08-24 PROCEDURE — 3008F BODY MASS INDEX DOCD: CPT | Performed by: FAMILY MEDICINE

## 2020-08-24 PROCEDURE — G0438 PPPS, INITIAL VISIT: HCPCS | Performed by: FAMILY MEDICINE

## 2020-08-24 PROCEDURE — 99213 OFFICE O/P EST LOW 20 MIN: CPT | Performed by: FAMILY MEDICINE

## 2020-08-24 RX ORDER — NABUMETONE 500 MG/1
500 TABLET, FILM COATED ORAL 2 TIMES DAILY PRN
Qty: 180 TABLET | Refills: 1 | Status: SHIPPED | OUTPATIENT
Start: 2020-08-24 | End: 2021-03-23

## 2020-08-24 RX ORDER — HYDROCHLOROTHIAZIDE 25 MG/1
25 TABLET ORAL DAILY
Qty: 90 TABLET | Refills: 1 | Status: SHIPPED | OUTPATIENT
Start: 2020-08-24 | End: 2021-03-12

## 2020-08-24 RX ORDER — LOSARTAN POTASSIUM 100 MG/1
100 TABLET ORAL DAILY
Qty: 90 TABLET | Refills: 1 | Status: SHIPPED | OUTPATIENT
Start: 2020-08-24 | End: 2020-12-28

## 2020-08-24 NOTE — PROGRESS NOTES
Alecia Quinn is a 72year old female. Patient presents with:  Physical    HPI:   Alecia Quinn is a 72year old female seen for her annual physical.  Colonoscopy is up to date and is due to repeat in 2022.   Mammogram done last year was normal. Socioeconomic History      Marital status:       Spouse name: macario      Number of children: 3      Occupational History        Comment: RETIRED      Tobacco Use      Smoking status: Never Smoker      Smokeless tobacco: Never Used    Substance a time. She appears well-developed and well-nourished. No distress. HENT:   Head: Normocephalic and atraumatic.    Right Ear: Tympanic membrane, external ear and ear canal normal.   Left Ear: Tympanic membrane, external ear and ear canal normal.   Nose: Nos [facility] SCREENING BILAT (CPT=28891); Future    Primary osteoarthritis of left shoulder  -     Nabumetone 500 MG Oral Tab; Take 1 tablet (500 mg total) by mouth 2 (two) times daily as needed for Pain.     Immunity status testing  -     VARICELLA ZOSTER, IGG; Fut

## 2020-10-29 ENCOUNTER — IMMUNIZATION (OUTPATIENT)
Dept: FAMILY MEDICINE CLINIC | Facility: CLINIC | Age: 70
End: 2020-10-29
Payer: COMMERCIAL

## 2020-10-29 DIAGNOSIS — Z23 NEED FOR VACCINATION: ICD-10-CM

## 2020-10-29 PROCEDURE — 90662 IIV NO PRSV INCREASED AG IM: CPT | Performed by: FAMILY MEDICINE

## 2020-10-29 PROCEDURE — 90471 IMMUNIZATION ADMIN: CPT | Performed by: FAMILY MEDICINE

## 2020-12-09 ENCOUNTER — TELEPHONE (OUTPATIENT)
Dept: FAMILY MEDICINE CLINIC | Facility: CLINIC | Age: 70
End: 2020-12-09

## 2020-12-09 DIAGNOSIS — R05.9 COUGH: Primary | ICD-10-CM

## 2020-12-09 NOTE — TELEPHONE ENCOUNTER
Called and spoke with pt's , macario (not on HIPAA), stating pt does not speak [place].    Confirmed with , pt nor , have not been exposed to someone COVID positive and are not experiencing any symptoms, advised we are referring our p

## 2020-12-22 NOTE — TELEPHONE ENCOUNTER
Called and spoke with pt's , macario (not on HIPAA), stating pt x3 days started with cough, HA, and fever- unknown temp as has not checked but pt feels warm. No covid exposure. Reviewed supportive measures-  push fluids, OTC Delsym PRN.  OTC Tyleno

## 2020-12-22 NOTE — TELEPHONE ENCOUNTER
Pt's  called re: msg below, said pt now has fever and cough she was not able to get test done at [facility], asking to sp w/ nurse wants order to get test done through [address]

## 2020-12-22 NOTE — TELEPHONE ENCOUNTER
Future Appointments   Date Time Provider Graciela Kate   12/28/2020  9:00 AM Dalia Escobar MD [facility]

## 2020-12-28 ENCOUNTER — VIRTUAL PHONE E/M (OUTPATIENT)
Dept: FAMILY MEDICINE CLINIC | Facility: CLINIC | Age: 70
End: 2020-12-28
Payer: COMMERCIAL

## 2020-12-28 VITALS — HEIGHT: 57 IN | WEIGHT: 148 LBS | BODY MASS INDEX: 31.93 KG/M2

## 2020-12-28 DIAGNOSIS — M19.012 PRIMARY OSTEOARTHRITIS OF LEFT SHOULDER: ICD-10-CM

## 2020-12-28 DIAGNOSIS — I10 ESSENTIAL HYPERTENSION: ICD-10-CM

## 2020-12-28 PROCEDURE — 3008F BODY MASS INDEX DOCD: CPT | Performed by: FAMILY MEDICINE

## 2020-12-28 PROCEDURE — 99212 OFFICE O/P EST SF 10 MIN: CPT | Performed by: FAMILY MEDICINE

## 2020-12-28 RX ORDER — TRAMADOL HYDROCHLORIDE 50 MG/1
TABLET ORAL
COMMUNITY
Start: 2020-11-14 | End: 2020-12-28 | Stop reason: ALTCHOICE

## 2020-12-28 NOTE — PROGRESS NOTES
Shereen Session is a 72year old female. Patient presents with:  Medication Follow-Up      Shereen Session verbally consents to a Virtual/Telephone Check-In visit on 12/28/20.   Patient has been referred to the [facility] website at [email or web address] orders for this visit:    Essential hypertension    Primary osteoarthritis of left shoulder    Called patient's pharmacy and verified, patient does have refills available, advised pharmacy staff to give patient a call in to refill medication.     Please not

## 2020-12-30 ENCOUNTER — TELEPHONE (OUTPATIENT)
Dept: FAMILY MEDICINE CLINIC | Facility: CLINIC | Age: 70
End: 2020-12-30

## 2020-12-30 LAB — AMB EXT COVID-19 RESULT: DETECTED

## 2020-12-30 NOTE — TELEPHONE ENCOUNTER
Pt's daughter called to let Dr. Marzena Branch know that the pt tested positive for covid. Including pt's  and son. States low grade fever but no other symptoms. Quarantining away from each other.

## 2020-12-30 NOTE — TELEPHONE ENCOUNTER
Spoke to patient's daughter, she is not with patient. Patient is on speaker phone with daughter. [facility] Department came to patient's home and tested her and instructed on self quarantine. Patient's  is upstairs and patient is downstairs. Tested co

## 2021-01-05 ENCOUNTER — VIRTUAL PHONE E/M (OUTPATIENT)
Dept: FAMILY MEDICINE CLINIC | Facility: CLINIC | Age: 71
End: 2021-01-05
Payer: COMMERCIAL

## 2021-01-05 VITALS — OXYGEN SATURATION: 98 % | TEMPERATURE: 98 F | HEART RATE: 91 BPM

## 2021-01-05 DIAGNOSIS — U07.1 COVID-19 VIRUS INFECTION: Primary | ICD-10-CM

## 2021-01-05 PROCEDURE — 99442 PHONE E/M BY PHYS 11-20 MIN: CPT | Performed by: FAMILY MEDICINE

## 2021-01-05 RX ORDER — NABUMETONE 500 MG/1
TABLET, FILM COATED ORAL
COMMUNITY
Start: 2020-12-28

## 2021-01-05 RX ORDER — HYDROCHLOROTHIAZIDE 25 MG/1
TABLET ORAL
COMMUNITY
Start: 2020-12-28 | End: 2021-03-12

## 2021-01-05 RX ORDER — LOSARTAN POTASSIUM 100 MG/1
TABLET ORAL
COMMUNITY
Start: 2020-12-28 | End: 2021-03-12

## 2021-01-05 NOTE — PROGRESS NOTES
Yanique Main is a 72year old female. Patient presents with:  Covid: f/u feeling fine    Virtual Telephone Check-In    Yanique Main verbally consents to a Virtual/Telephone Check-In visit on 01/05/21.   Patient has been referred to the [facility] webs CARDIOVASCULAR: denies chest pain     EXAM:   Pulse 91   Temp 98.3 °F (36.8 °C) (Temporal)   SpO2 98%   Patient not examined    ASSESSMENT AND PLAN:   Karina was seen today for covid.     Diagnoses and all orders for this visit:    COVID-19 virus infect

## 2021-01-11 ENCOUNTER — TELEPHONE (OUTPATIENT)
Dept: FAMILY MEDICINE CLINIC | Facility: CLINIC | Age: 71
End: 2021-01-11

## 2021-01-11 DIAGNOSIS — R05.9 COUGH: ICD-10-CM

## 2021-01-11 RX ORDER — BENZONATATE 200 MG/1
200 CAPSULE ORAL 3 TIMES DAILY PRN
Qty: 21 CAPSULE | Refills: 0 | Status: SHIPPED | OUTPATIENT
Start: 2021-01-11 | End: 2021-01-18

## 2021-01-11 NOTE — TELEPHONE ENCOUNTER
Prescription sent, please advise them to monitor her pulse ox as patient did test positive for covid, if saturation less than 90 needs to go to ER, if cough not improving needs to schedule a follow up appointment.

## 2021-01-11 NOTE — TELEPHONE ENCOUNTER
No call back# confirmed. [phone]  Called and spoke with pt's , Verna Hutton ([id number] [address] per HIPAA), stating pt has been experiencing intermittent cough x1 week. No improvements with OTC delsym. Congestion subsided. No c/o any new sx. No sore throat.  No

## 2021-01-11 NOTE — TELEPHONE ENCOUNTER
[phone]  Called and spoke with pt's , Jose Neri ([id number] [address] per HIPAA), to inform per PCP sent rx to [address]  Advised to monitor pt's pulse ox as aware pt did test covid positive, if less than 90%, CP, SOB, fever, need to go to ER- hu

## 2021-03-10 DIAGNOSIS — I10 ESSENTIAL HYPERTENSION WITH GOAL BLOOD PRESSURE LESS THAN 130/80: ICD-10-CM

## 2021-03-10 DIAGNOSIS — I10 ESSENTIAL HYPERTENSION: ICD-10-CM

## 2021-03-10 DIAGNOSIS — R05.9 COUGH: ICD-10-CM

## 2021-03-10 RX ORDER — BENZONATATE 200 MG/1
CAPSULE ORAL
Qty: 21 CAPSULE | Refills: 0 | OUTPATIENT
Start: 2021-03-10

## 2021-03-10 NOTE — TELEPHONE ENCOUNTER
LOV 8/24/2020    LAST LAB 12-15-20    LAST RX 12-28-20    Next OV   Future Appointments   Date Time Provider Graciela Love   3/23/2021 10:20 AM Mary Rodriguez MD [facility]         PROTOCOL  Name from pharmacy: LOSARTAN/HCTZ 100/25MG TABLETS

## 2021-03-12 DIAGNOSIS — I10 ESSENTIAL HYPERTENSION: ICD-10-CM

## 2021-03-12 DIAGNOSIS — Z23 NEED FOR VACCINATION: ICD-10-CM

## 2021-03-12 RX ORDER — HYDROCHLOROTHIAZIDE 25 MG/1
TABLET ORAL
Qty: 90 TABLET | Refills: 0 | OUTPATIENT
Start: 2021-03-12

## 2021-03-12 RX ORDER — HYDROCHLOROTHIAZIDE 25 MG/1
25 TABLET ORAL DAILY
Qty: 30 TABLET | Refills: 0 | Status: SHIPPED | OUTPATIENT
Start: 2021-03-12 | End: 2021-03-23

## 2021-03-12 RX ORDER — LOSARTAN POTASSIUM 100 MG/1
TABLET ORAL
Qty: 30 TABLET | Refills: 0 | Status: SHIPPED | OUTPATIENT
Start: 2021-03-12 | End: 2021-03-23

## 2021-03-12 RX ORDER — LOSARTAN POTASSIUM 100 MG/1
TABLET ORAL
Qty: 90 TABLET | Refills: 0 | OUTPATIENT
Start: 2021-03-12

## 2021-03-12 RX ORDER — LOSARTAN POTASSIUM AND HYDROCHLOROTHIAZIDE 25; 100 MG/1; MG/1
1 TABLET ORAL DAILY
Qty: 90 TABLET | Refills: 1 | OUTPATIENT
Start: 2021-03-12 | End: 2022-07-09

## 2021-03-12 NOTE — TELEPHONE ENCOUNTER
LOV 8/24/2020    LAST LAB    LAST RX     Next OV   Future Appointments   Date Time Provider Graciela Love   3/23/2021 10:20 AM Bill Macias MD [facility]         PROTOCOL  Name from pharmacy: HYDROCHLOROTHIAZIDE 25MG TABLETS          Will fi

## 2021-03-23 ENCOUNTER — OFFICE VISIT (OUTPATIENT)
Dept: FAMILY MEDICINE CLINIC | Facility: CLINIC | Age: 71
End: 2021-03-23
Payer: COMMERCIAL

## 2021-03-23 VITALS
TEMPERATURE: 98 F | RESPIRATION RATE: 18 BRPM | DIASTOLIC BLOOD PRESSURE: 72 MMHG | WEIGHT: 135 LBS | BODY MASS INDEX: 29.12 KG/M2 | OXYGEN SATURATION: 98 % | HEIGHT: 57 IN | SYSTOLIC BLOOD PRESSURE: 112 MMHG | HEART RATE: 91 BPM

## 2021-03-23 DIAGNOSIS — M19.012 PRIMARY OSTEOARTHRITIS OF LEFT SHOULDER: ICD-10-CM

## 2021-03-23 DIAGNOSIS — I10 ESSENTIAL HYPERTENSION: Primary | ICD-10-CM

## 2021-03-23 PROCEDURE — 3008F BODY MASS INDEX DOCD: CPT | Performed by: FAMILY MEDICINE

## 2021-03-23 PROCEDURE — 99213 OFFICE O/P EST LOW 20 MIN: CPT | Performed by: FAMILY MEDICINE

## 2021-03-23 PROCEDURE — 3078F DIAST BP <80 MM HG: CPT | Performed by: FAMILY MEDICINE

## 2021-03-23 PROCEDURE — 3074F SYST BP LT 130 MM HG: CPT | Performed by: FAMILY MEDICINE

## 2021-03-23 RX ORDER — LOSARTAN POTASSIUM 100 MG/1
100 TABLET ORAL DAILY
Qty: 90 TABLET | Refills: 1 | Status: SHIPPED | OUTPATIENT
Start: 2021-03-23 | End: 2021-10-21

## 2021-03-23 RX ORDER — HYDROCHLOROTHIAZIDE 25 MG/1
25 TABLET ORAL DAILY
Qty: 90 TABLET | Refills: 1 | Status: SHIPPED | OUTPATIENT
Start: 2021-03-23 | End: 2021-06-21

## 2021-03-23 RX ORDER — NABUMETONE 500 MG/1
500 TABLET, FILM COATED ORAL 2 TIMES DAILY PRN
Qty: 180 TABLET | Refills: 1 | Status: SHIPPED | OUTPATIENT
Start: 2021-03-23 | End: 2021-12-21

## 2021-03-23 NOTE — PATIENT INSTRUCTIONS
Please schedule your mammogram      Controlling High Blood Pressure  High blood pressure (hypertension) is often called the silent killer. This is because many people who have it, don’t know it. It can be very dangerous.  High blood pressure can raise your you go to a restaurant, ask that your meal be prepared with no added salt. Stay at a healthy weight  · Ask your healthcare provider how many calories to eat a day. Then stick to that number.   · Ask your healthcare provider what weight range is healthies rights reserved. This information is not intended as a substitute for professional medical care. Always follow your healthcare professional's instructions.

## 2021-03-23 NOTE — PROGRESS NOTES
Jake Saenz is a 72year old female. Patient presents with:  Hypertension    HPI:   Jake Saenz is a 72year old female with HTN and arthritis seen for follow up and medication refill.     HTN: Patient is seen for follow-up and medication ref unusual skin lesions or rashes  RESPIRATORY: denies shortness of breath with exertion  CARDIOVASCULAR: denies chest pain on exertion  GI: denies abdominal pain and denies heartburn  MUSCULOSKELETAL: as per HPI  NEURO: denies headaches    EXAM:   /72

## 2021-05-10 DIAGNOSIS — I10 ESSENTIAL HYPERTENSION: ICD-10-CM

## 2021-05-10 DIAGNOSIS — M19.012 PRIMARY OSTEOARTHRITIS OF LEFT SHOULDER: ICD-10-CM

## 2021-05-10 RX ORDER — HYDROCHLOROTHIAZIDE 25 MG/1
25 TABLET ORAL DAILY
Qty: 90 TABLET | Refills: 1 | OUTPATIENT
Start: 2021-05-10 | End: 2021-08-08

## 2021-05-10 RX ORDER — NABUMETONE 500 MG/1
500 TABLET, FILM COATED ORAL 2 TIMES DAILY PRN
Qty: 180 TABLET | Refills: 1 | OUTPATIENT
Start: 2021-05-10 | End: 2021-08-08

## 2021-05-10 RX ORDER — LOSARTAN POTASSIUM 100 MG/1
100 TABLET ORAL DAILY
Qty: 90 TABLET | Refills: 1 | OUTPATIENT
Start: 2021-05-10

## 2021-06-12 ENCOUNTER — HOSPITAL ENCOUNTER (OUTPATIENT)
Dept: MAMMOGRAPHY | Facility: HOSPITAL | Age: 71
Discharge: HOME | End: 2021-06-12
Attending: FAMILY MEDICINE
Payer: COMMERCIAL

## 2021-06-12 DIAGNOSIS — Z12.31 ENCOUNTER FOR SCREENING MAMMOGRAM FOR BREAST CANCER: ICD-10-CM

## 2021-06-12 PROCEDURE — 77063 BREAST TOMOSYNTHESIS BI: CPT | Performed by: FAMILY MEDICINE

## 2021-06-12 PROCEDURE — 77067 SCR MAMMO BI INCL CAD: CPT | Performed by: FAMILY MEDICINE

## 2021-07-01 LAB
ALBUMIN/GLOBULIN RATIO: 1.6 (CALC) (ref 1–2.5)
ALBUMIN: 4.2 G/DL (ref 3.6–5.1)
ALKALINE PHOSPHATASE: 78 U/L (ref 37–153)
ALT: 18 U/L (ref 6–29)
AST: 20 U/L (ref 10–35)
BILIRUBIN, TOTAL: 0.4 MG/DL (ref 0.2–1.2)
BUN: 20 MG/DL (ref 7–25)
CALCIUM: 9.4 MG/DL (ref 8.6–10.4)
CARBON DIOXIDE: 29 MMOL/L (ref 20–32)
CHLORIDE: 103 MMOL/L (ref 98–110)
CHOL/HDLC RATIO: 3.1 (CALC)
CHOLESTEROL, TOTAL: 206 MG/DL
CREATININE: 0.66 MG/DL (ref 0.5–0.99)
EGFR IF AFRICN AM: 104 ML/MIN/1.73M2
EGFR IF NONAFRICN AM: 90 ML/MIN/1.73M2
GLOBULIN: 2.7 G/DL (CALC) (ref 1.9–3.7)
GLUCOSE: 99 MG/DL (ref 65–99)
HDL CHOLESTEROL: 66 MG/DL
LDL-CHOLESTEROL: 120 MG/DL (CALC)
NON-HDL CHOLESTEROL: 140 MG/DL (CALC)
POTASSIUM: 4.1 MMOL/L (ref 3.5–5.3)
PROTEIN, TOTAL: 6.9 G/DL (ref 6.1–8.1)
SODIUM: 140 MMOL/L (ref 135–146)
TRIGLYCERIDES: 97 MG/DL

## 2021-10-18 DIAGNOSIS — I10 ESSENTIAL HYPERTENSION: ICD-10-CM

## 2021-10-19 NOTE — TELEPHONE ENCOUNTER
LOV 3/23/2021    LAST LAB 6-30-21    LAST RX 3-23-21 90*1    Next OV   Future Appointments   Date Time Provider Graciela Kate   12/21/2021  9:40 AM Katherin Chopra MD [facility]         PROTOCOL  asia from pharmacy: LOSARTAN 100MG TABLETS

## 2021-10-21 DIAGNOSIS — I10 ESSENTIAL HYPERTENSION: ICD-10-CM

## 2021-10-21 RX ORDER — LOSARTAN POTASSIUM 100 MG/1
TABLET ORAL
Qty: 30 TABLET | Refills: 0 | Status: SHIPPED | OUTPATIENT
Start: 2021-10-21 | End: 2021-11-22

## 2021-10-21 RX ORDER — HYDROCHLOROTHIAZIDE 25 MG/1
TABLET ORAL
Qty: 30 TABLET | Refills: 0 | Status: SHIPPED | OUTPATIENT
Start: 2021-10-21 | End: 2021-11-22

## 2021-10-22 RX ORDER — LOSARTAN POTASSIUM 100 MG/1
TABLET ORAL
Qty: 90 TABLET | Refills: 0 | OUTPATIENT
Start: 2021-10-22

## 2021-10-22 RX ORDER — HYDROCHLOROTHIAZIDE 25 MG/1
TABLET ORAL
Qty: 90 TABLET | Refills: 0 | OUTPATIENT
Start: 2021-10-22

## 2021-10-22 NOTE — TELEPHONE ENCOUNTER
LOV 3/23/2021    LAST LAB    LAST RX    Next OV   Future Appointments   Date Time Provider Graciela Love   12/21/2021  9:40 AM Kizzy Carbone MD [facility]         PROTOCOL  Name from pharmacy: LOSARTAN 100MG TABLETS          Will file in [facility]

## 2021-10-25 ENCOUNTER — IMMUNIZATION (OUTPATIENT)
Dept: FAMILY MEDICINE CLINIC | Facility: CLINIC | Age: 71
End: 2021-10-25
Payer: COMMERCIAL

## 2021-10-25 DIAGNOSIS — Z23 NEED FOR VACCINATION: Primary | ICD-10-CM

## 2021-10-25 PROCEDURE — 90471 IMMUNIZATION ADMIN: CPT | Performed by: FAMILY MEDICINE

## 2021-10-25 PROCEDURE — 90662 IIV NO PRSV INCREASED AG IM: CPT | Performed by: FAMILY MEDICINE

## 2021-11-22 DIAGNOSIS — I10 ESSENTIAL HYPERTENSION: ICD-10-CM

## 2021-11-22 RX ORDER — HYDROCHLOROTHIAZIDE 25 MG/1
TABLET ORAL
Qty: 30 TABLET | Refills: 0 | Status: SHIPPED | OUTPATIENT
Start: 2021-11-22 | End: 2021-12-21

## 2021-11-22 RX ORDER — LOSARTAN POTASSIUM 100 MG/1
TABLET ORAL
Qty: 90 TABLET | Refills: 0 | OUTPATIENT
Start: 2021-11-22

## 2021-11-22 RX ORDER — HYDROCHLOROTHIAZIDE 25 MG/1
TABLET ORAL
Qty: 90 TABLET | Refills: 0 | OUTPATIENT
Start: 2021-11-22

## 2021-11-22 RX ORDER — LOSARTAN POTASSIUM 100 MG/1
TABLET ORAL
Qty: 30 TABLET | Refills: 0 | Status: SHIPPED | OUTPATIENT
Start: 2021-11-22 | End: 2021-12-21

## 2021-11-22 NOTE — TELEPHONE ENCOUNTER
LOV 3/23/2021    LAST LAB 6-3-21    LAST RX 10-21-21 30*0 for both meds    Next OV   Future Appointments   Date Time Provider Graciela Love   12/21/2021  9:40 AM Jose Luis Meelndez MD [facility]         PROTOCOL   Name from pharmacy: Bobbi Sandoval

## 2021-12-21 ENCOUNTER — TELEMEDICINE (OUTPATIENT)
Dept: FAMILY MEDICINE CLINIC | Facility: CLINIC | Age: 71
End: 2021-12-21
Payer: COMMERCIAL

## 2021-12-21 VITALS — DIASTOLIC BLOOD PRESSURE: 78 MMHG | SYSTOLIC BLOOD PRESSURE: 120 MMHG

## 2021-12-21 DIAGNOSIS — I10 ESSENTIAL HYPERTENSION: ICD-10-CM

## 2021-12-21 DIAGNOSIS — M19.012 PRIMARY OSTEOARTHRITIS OF LEFT SHOULDER: ICD-10-CM

## 2021-12-21 PROCEDURE — 3078F DIAST BP <80 MM HG: CPT | Performed by: FAMILY MEDICINE

## 2021-12-21 PROCEDURE — 3074F SYST BP LT 130 MM HG: CPT | Performed by: FAMILY MEDICINE

## 2021-12-21 PROCEDURE — 99213 OFFICE O/P EST LOW 20 MIN: CPT | Performed by: FAMILY MEDICINE

## 2021-12-21 RX ORDER — HYDROCHLOROTHIAZIDE 25 MG/1
25 TABLET ORAL DAILY
Qty: 90 TABLET | Refills: 1 | Status: SHIPPED | OUTPATIENT
Start: 2021-12-21

## 2021-12-21 RX ORDER — LOSARTAN POTASSIUM 100 MG/1
100 TABLET ORAL DAILY
Qty: 90 TABLET | Refills: 1 | Status: SHIPPED | OUTPATIENT
Start: 2021-12-21

## 2021-12-21 RX ORDER — NABUMETONE 500 MG/1
500 TABLET, FILM COATED ORAL 2 TIMES DAILY PRN
Qty: 180 TABLET | Refills: 1 | Status: SHIPPED | OUTPATIENT
Start: 2021-12-21 | End: 2022-03-21

## 2021-12-21 NOTE — PROGRESS NOTES
Yaritza Zhou is a 80year old female. Patient presents with:  HTN: medication refill    This visit is conducted using telemedicine with live interactive video and audio. Yaritza Zhou verbally consents to virtual video visit.    Patient Sera Nascimento denies shortness of breath with exertion  CARDIOVASCULAR: denies chest pain on exertion  NEURO: denies headaches    EXAM:   Blood pressure 120/78.     GENERAL: well developed, well nourished,in no apparent distress  HEENT: atraumatic, normocephalic  NECK: s

## 2022-02-16 LAB
ALBUMIN/GLOBULIN RATIO: 1.7 (CALC) (ref 1–2.5)
ALBUMIN: 4.3 G/DL (ref 3.6–5.1)
ALT: 15 U/L (ref 6–29)
AST: 17 U/L (ref 10–35)
BILIRUBIN, TOTAL: 0.4 MG/DL (ref 0.2–1.2)
BUN: 16 MG/DL (ref 7–25)
CALCIUM: 9.3 MG/DL (ref 8.6–10.4)
CARBON DIOXIDE: 26 MMOL/L (ref 20–32)
CHLORIDE: 103 MMOL/L (ref 98–110)
CHOL/HDLC RATIO: 3 (CALC)
CHOLESTEROL, TOTAL: 202 MG/DL
CREATININE: 0.66 MG/DL (ref 0.6–0.93)
EGFR IF AFRICN AM: 104 ML/MIN/1.73M2
EGFR IF NONAFRICN AM: 89 ML/MIN/1.73M2
GLOBULIN: 2.5 G/DL (CALC) (ref 1.9–3.7)
GLUCOSE: 84 MG/DL (ref 65–99)
HDL CHOLESTEROL: 68 MG/DL
LDL-CHOLESTEROL: 115 MG/DL (CALC)
NON-HDL CHOLESTEROL: 134 MG/DL (CALC)
POTASSIUM: 4.1 MMOL/L (ref 3.5–5.3)
PROTEIN, TOTAL: 6.8 G/DL (ref 6.1–8.1)
SODIUM: 139 MMOL/L (ref 135–146)
TRIGLYCERIDES: 91 MG/DL

## 2022-04-28 RX ORDER — LOSARTAN POTASSIUM 100 MG/1
TABLET ORAL
Qty: 90 TABLET | Refills: 1 | OUTPATIENT
Start: 2022-04-28

## 2022-04-28 RX ORDER — HYDROCHLOROTHIAZIDE 25 MG/1
TABLET ORAL
Qty: 90 TABLET | Refills: 1 | OUTPATIENT
Start: 2022-04-28

## 2022-06-01 DIAGNOSIS — I10 ESSENTIAL HYPERTENSION: ICD-10-CM

## 2022-06-01 NOTE — TELEPHONE ENCOUNTER
Pt called to schedule appt, however no openings available for physical until middle/end of August. Scheduled pt for follow up only to be seen sooner. Future Appointments   Date Time Provider Graciela Love   7/29/2022 10:20 AM Waqar Tavera MD [facility]     Pt is out of medication,  hydroCHLOROthiazide 25 MG Oral Tab  & losartan 100 MG Oral Tab      Can we send refill until appt?  Please advise

## 2022-06-03 RX ORDER — LOSARTAN POTASSIUM 100 MG/1
100 TABLET ORAL DAILY
Qty: 90 TABLET | Refills: 0 | Status: SHIPPED | OUTPATIENT
Start: 2022-06-03

## 2022-06-03 RX ORDER — HYDROCHLOROTHIAZIDE 25 MG/1
25 TABLET ORAL DAILY
Qty: 90 TABLET | Refills: 0 | Status: SHIPPED | OUTPATIENT
Start: 2022-06-03

## 2022-06-09 ENCOUNTER — TELEPHONE (OUTPATIENT)
Dept: FAMILY MEDICINE CLINIC | Facility: CLINIC | Age: 72
End: 2022-06-09

## 2022-06-09 NOTE — TELEPHONE ENCOUNTER
Pt's  dropped off form to be completed. Asked if we can mail it off once done(provided envelope with stamp) and also [facility]      Placing in bin by triage

## 2022-06-14 ENCOUNTER — OFFICE VISIT (OUTPATIENT)
Dept: FAMILY MEDICINE CLINIC | Facility: CLINIC | Age: 72
End: 2022-06-14
Payer: COMMERCIAL

## 2022-06-14 ENCOUNTER — MED REC SCAN ONLY (OUTPATIENT)
Dept: FAMILY MEDICINE CLINIC | Facility: CLINIC | Age: 72
End: 2022-06-14

## 2022-06-14 VITALS
RESPIRATION RATE: 18 BRPM | OXYGEN SATURATION: 98 % | TEMPERATURE: 98 F | HEART RATE: 93 BPM | BODY MASS INDEX: 30.63 KG/M2 | SYSTOLIC BLOOD PRESSURE: 118 MMHG | DIASTOLIC BLOOD PRESSURE: 62 MMHG | HEIGHT: 57 IN | WEIGHT: 142 LBS

## 2022-06-14 DIAGNOSIS — Z12.11 SCREENING FOR COLON CANCER: ICD-10-CM

## 2022-06-14 DIAGNOSIS — E78.00 PURE HYPERCHOLESTEROLEMIA: Primary | ICD-10-CM

## 2022-06-14 DIAGNOSIS — Z12.31 ENCOUNTER FOR SCREENING MAMMOGRAM FOR MALIGNANT NEOPLASM OF BREAST: ICD-10-CM

## 2022-06-14 DIAGNOSIS — M19.012 PRIMARY OSTEOARTHRITIS OF LEFT SHOULDER: ICD-10-CM

## 2022-06-14 DIAGNOSIS — I10 ESSENTIAL HYPERTENSION: ICD-10-CM

## 2022-06-14 PROCEDURE — 3074F SYST BP LT 130 MM HG: CPT | Performed by: FAMILY MEDICINE

## 2022-06-14 PROCEDURE — 99214 OFFICE O/P EST MOD 30 MIN: CPT | Performed by: FAMILY MEDICINE

## 2022-06-14 PROCEDURE — 3008F BODY MASS INDEX DOCD: CPT | Performed by: FAMILY MEDICINE

## 2022-06-14 PROCEDURE — 3078F DIAST BP <80 MM HG: CPT | Performed by: FAMILY MEDICINE

## 2022-06-14 RX ORDER — NABUMETONE 500 MG/1
500 TABLET, FILM COATED ORAL 2 TIMES DAILY PRN
Qty: 180 TABLET | Refills: 1 | Status: SHIPPED | OUTPATIENT
Start: 2022-06-14 | End: 2022-09-12

## 2022-06-14 RX ORDER — LOSARTAN POTASSIUM 100 MG/1
100 TABLET ORAL DAILY
Qty: 90 TABLET | Refills: 1 | Status: SHIPPED | OUTPATIENT
Start: 2022-06-14

## 2022-06-14 RX ORDER — HYDROCHLOROTHIAZIDE 25 MG/1
25 TABLET ORAL DAILY
Qty: 90 TABLET | Refills: 1 | Status: SHIPPED | OUTPATIENT
Start: 2022-06-14

## 2022-08-02 NOTE — PATIENT INSTRUCTIONS
Prevention Guidelines, Women Ages 72 and Older  Screening tests and vaccines are an important part of managing your health. A screening test is done to find possible disorders or diseases in people who don't have any symptoms.  The goal is to find a disea VISIT SUMMARY:     Testing needed: Limited echo    Labs needed: NONE    Consults: NONE     Follow up: Follow up in 6 months, sooner if needed based on test results    If you have any questions or concerns, please call our office- [phone].     This screening is advised against for women over 75 or if there is a life expectancy of less than 10 years.  Multiple tests are available and are used at different times.  Possible tests include: flexible sigmoidoscopy, colonoscopy, double-contrast barium e Vision All women in this age group Every 1 to 2 years; if you have a chronic health condition, ask your healthcare provider if you need exams more often   Vaccine Who needs it How often   Chickenpox (varicella) All women in this age group who have no recor Use of daily aspirin Talk to your healthcare provider about whether or not to start taking low-dose aspirin for the prevention of cardiovascular disease (CVD) and colorectal cancer in adults ages 61 to 71 who have at least a 10% risk of getting CVD within

## 2022-09-16 DIAGNOSIS — I10 ESSENTIAL HYPERTENSION: ICD-10-CM

## 2022-09-16 RX ORDER — HYDROCHLOROTHIAZIDE 25 MG/1
TABLET ORAL
Qty: 90 TABLET | Refills: 1 | OUTPATIENT
Start: 2022-09-16

## 2022-09-16 NOTE — TELEPHONE ENCOUNTER
Hypertension Medications Protocol Passed 09/16/2022 09:22 AM   Protocol Details  CMP or BMP in past 12 months    Last serum creatinine< 2.0    Appointment in past 6 or next 3 months        LOV  6/14/22     LAST LAB  2/15/22     LAST RX  6/14/22 90 with 1      Next OV   Future Appointments   Date Time Provider Graciela Love   12/7/2022 10:20 AM Jolynn Hinton MD [facility]         PROTOCOL pass   Refill to soon  .

## 2022-11-08 ENCOUNTER — IMMUNIZATION (OUTPATIENT)
Dept: FAMILY MEDICINE CLINIC | Facility: CLINIC | Age: 72
End: 2022-11-08
Payer: COMMERCIAL

## 2022-11-08 DIAGNOSIS — Z23 NEED FOR VACCINATION: Primary | ICD-10-CM

## 2022-11-08 PROCEDURE — 90662 IIV NO PRSV INCREASED AG IM: CPT | Performed by: FAMILY MEDICINE

## 2022-11-08 PROCEDURE — 90471 IMMUNIZATION ADMIN: CPT | Performed by: FAMILY MEDICINE

## 2022-11-18 LAB
ALBUMIN/GLOBULIN RATIO: 1.6 (CALC) (ref 1–2.5)
ALBUMIN: 4.4 G/DL (ref 3.6–5.1)
ALKALINE PHOSPHATASE: 76 U/L (ref 37–153)
ALT: 14 U/L (ref 6–29)
AST: 16 U/L (ref 10–35)
BILIRUBIN, TOTAL: 0.6 MG/DL (ref 0.2–1.2)
BUN: 17 MG/DL (ref 7–25)
CALCIUM: 9.7 MG/DL (ref 8.6–10.4)
CARBON DIOXIDE: 30 MMOL/L (ref 20–32)
CHLORIDE: 103 MMOL/L (ref 98–110)
CHOL/HDLC RATIO: 3.2 (CALC)
CHOLESTEROL, TOTAL: 197 MG/DL
CREATININE: 0.68 MG/DL (ref 0.6–1)
EGFR: 93 ML/MIN/1.73M2
GLOBULIN: 2.7 G/DL (CALC) (ref 1.9–3.7)
GLUCOSE: 85 MG/DL (ref 65–99)
HDL CHOLESTEROL: 61 MG/DL
LDL-CHOLESTEROL: 113 MG/DL (CALC)
NON-HDL CHOLESTEROL: 136 MG/DL (CALC)
POTASSIUM: 4.1 MMOL/L (ref 3.5–5.3)
PROTEIN, TOTAL: 7.1 G/DL (ref 6.1–8.1)
SODIUM: 139 MMOL/L (ref 135–146)
TRIGLYCERIDES: 124 MG/DL

## 2022-12-07 ENCOUNTER — OFFICE VISIT (OUTPATIENT)
Dept: FAMILY MEDICINE CLINIC | Facility: CLINIC | Age: 72
End: 2022-12-07
Payer: COMMERCIAL

## 2022-12-07 VITALS
WEIGHT: 149 LBS | BODY MASS INDEX: 32.15 KG/M2 | HEART RATE: 88 BPM | TEMPERATURE: 98 F | SYSTOLIC BLOOD PRESSURE: 130 MMHG | DIASTOLIC BLOOD PRESSURE: 72 MMHG | OXYGEN SATURATION: 98 % | HEIGHT: 57 IN | RESPIRATION RATE: 18 BRPM

## 2022-12-07 DIAGNOSIS — M75.02 ADHESIVE CAPSULITIS OF LEFT SHOULDER: ICD-10-CM

## 2022-12-07 DIAGNOSIS — M19.012 PRIMARY OSTEOARTHRITIS OF LEFT SHOULDER: ICD-10-CM

## 2022-12-07 DIAGNOSIS — M54.2 NECK PAIN: ICD-10-CM

## 2022-12-07 DIAGNOSIS — I10 ESSENTIAL HYPERTENSION: Primary | ICD-10-CM

## 2022-12-07 DIAGNOSIS — M25.512 ACUTE PAIN OF LEFT SHOULDER: ICD-10-CM

## 2022-12-07 DIAGNOSIS — Z78.0 POSTMENOPAUSAL: ICD-10-CM

## 2022-12-07 DIAGNOSIS — Z00.00 LABORATORY EXAMINATION ORDERED AS PART OF A ROUTINE GENERAL MEDICAL EXAMINATION: ICD-10-CM

## 2022-12-07 DIAGNOSIS — M62.838 SPASM OF MUSCLE: ICD-10-CM

## 2022-12-07 PROCEDURE — 3008F BODY MASS INDEX DOCD: CPT | Performed by: FAMILY MEDICINE

## 2022-12-07 PROCEDURE — 3078F DIAST BP <80 MM HG: CPT | Performed by: FAMILY MEDICINE

## 2022-12-07 PROCEDURE — 99214 OFFICE O/P EST MOD 30 MIN: CPT | Performed by: FAMILY MEDICINE

## 2022-12-07 PROCEDURE — 3075F SYST BP GE 130 - 139MM HG: CPT | Performed by: FAMILY MEDICINE

## 2022-12-07 RX ORDER — HYDROCHLOROTHIAZIDE 25 MG/1
25 TABLET ORAL DAILY
Qty: 90 TABLET | Refills: 1 | Status: SHIPPED | OUTPATIENT
Start: 2022-12-07

## 2022-12-07 RX ORDER — HYDROCODONE BITARTRATE AND ACETAMINOPHEN 5; 325 MG/1; MG/1
1 TABLET ORAL NIGHTLY
Qty: 15 TABLET | Refills: 0 | Status: SHIPPED | OUTPATIENT
Start: 2022-12-07 | End: 2022-12-22

## 2022-12-07 RX ORDER — LOSARTAN POTASSIUM 100 MG/1
100 TABLET ORAL DAILY
Qty: 90 TABLET | Refills: 1 | Status: SHIPPED | OUTPATIENT
Start: 2022-12-07

## 2022-12-07 RX ORDER — NABUMETONE 500 MG/1
500 TABLET, FILM COATED ORAL 2 TIMES DAILY PRN
Qty: 180 TABLET | Refills: 1 | Status: SHIPPED | OUTPATIENT
Start: 2022-12-07 | End: 2023-03-07

## 2022-12-07 RX ORDER — CYCLOBENZAPRINE HCL 10 MG
10 TABLET ORAL NIGHTLY
Qty: 15 TABLET | Refills: 0 | Status: SHIPPED | OUTPATIENT
Start: 2022-12-07 | End: 2022-12-22

## 2022-12-12 ENCOUNTER — TELEPHONE (OUTPATIENT)
Dept: PHYSICAL THERAPY | Facility: HOSPITAL | Age: 72
End: 2022-12-12

## 2022-12-21 ENCOUNTER — APPOINTMENT (OUTPATIENT)
Dept: PHYSICAL THERAPY | Age: 72
End: 2022-12-21
Attending: FAMILY MEDICINE
Payer: MEDICARE

## 2022-12-27 ENCOUNTER — APPOINTMENT (OUTPATIENT)
Dept: PHYSICAL THERAPY | Age: 72
End: 2022-12-27
Attending: FAMILY MEDICINE
Payer: MEDICARE

## 2023-01-03 ENCOUNTER — APPOINTMENT (OUTPATIENT)
Dept: PHYSICAL THERAPY | Age: 73
End: 2023-01-03
Attending: FAMILY MEDICINE
Payer: MEDICARE

## 2023-01-10 ENCOUNTER — APPOINTMENT (OUTPATIENT)
Dept: PHYSICAL THERAPY | Age: 73
End: 2023-01-10
Attending: FAMILY MEDICINE
Payer: MEDICARE

## 2023-01-17 ENCOUNTER — APPOINTMENT (OUTPATIENT)
Dept: PHYSICAL THERAPY | Age: 73
End: 2023-01-17
Attending: FAMILY MEDICINE
Payer: MEDICARE

## 2023-01-24 ENCOUNTER — APPOINTMENT (OUTPATIENT)
Dept: PHYSICAL THERAPY | Age: 73
End: 2023-01-24
Attending: FAMILY MEDICINE
Payer: MEDICARE

## 2023-01-25 ENCOUNTER — MED REC SCAN ONLY (OUTPATIENT)
Dept: FAMILY MEDICINE CLINIC | Facility: CLINIC | Age: 73
End: 2023-01-25

## 2023-02-02 ENCOUNTER — MED REC SCAN ONLY (OUTPATIENT)
Dept: FAMILY MEDICINE CLINIC | Facility: CLINIC | Age: 73
End: 2023-02-02

## 2023-04-23 DIAGNOSIS — I10 ESSENTIAL HYPERTENSION: ICD-10-CM

## 2023-04-25 RX ORDER — LOSARTAN POTASSIUM 100 MG/1
TABLET ORAL
Qty: 90 TABLET | Refills: 1 | OUTPATIENT
Start: 2023-04-25

## 2023-04-25 RX ORDER — HYDROCHLOROTHIAZIDE 25 MG/1
TABLET ORAL
Qty: 90 TABLET | Refills: 1 | OUTPATIENT
Start: 2023-04-25

## 2023-06-07 ENCOUNTER — OFFICE VISIT (OUTPATIENT)
Dept: FAMILY MEDICINE CLINIC | Facility: CLINIC | Age: 73
End: 2023-06-07
Payer: COMMERCIAL

## 2023-06-07 VITALS
RESPIRATION RATE: 18 BRPM | HEIGHT: 57 IN | OXYGEN SATURATION: 98 % | HEART RATE: 82 BPM | BODY MASS INDEX: 31.93 KG/M2 | SYSTOLIC BLOOD PRESSURE: 130 MMHG | WEIGHT: 148 LBS | TEMPERATURE: 98 F | DIASTOLIC BLOOD PRESSURE: 72 MMHG

## 2023-06-07 DIAGNOSIS — Z00.00 ROUTINE GENERAL MEDICAL EXAMINATION AT A HEALTH CARE FACILITY: Primary | ICD-10-CM

## 2023-06-07 DIAGNOSIS — Z12.31 ENCOUNTER FOR SCREENING MAMMOGRAM FOR MALIGNANT NEOPLASM OF BREAST: ICD-10-CM

## 2023-06-07 DIAGNOSIS — I10 ESSENTIAL HYPERTENSION: ICD-10-CM

## 2023-06-07 DIAGNOSIS — Z78.0 POSTMENOPAUSAL: ICD-10-CM

## 2023-06-07 DIAGNOSIS — M19.012 PRIMARY OSTEOARTHRITIS OF LEFT SHOULDER: ICD-10-CM

## 2023-06-07 PROCEDURE — 3078F DIAST BP <80 MM HG: CPT | Performed by: FAMILY MEDICINE

## 2023-06-07 PROCEDURE — 3008F BODY MASS INDEX DOCD: CPT | Performed by: FAMILY MEDICINE

## 2023-06-07 PROCEDURE — 3075F SYST BP GE 130 - 139MM HG: CPT | Performed by: FAMILY MEDICINE

## 2023-06-07 PROCEDURE — 99213 OFFICE O/P EST LOW 20 MIN: CPT | Performed by: FAMILY MEDICINE

## 2023-06-07 PROCEDURE — 99397 PER PM REEVAL EST PAT 65+ YR: CPT | Performed by: FAMILY MEDICINE

## 2023-06-07 RX ORDER — LOSARTAN POTASSIUM 100 MG/1
100 TABLET ORAL DAILY
Qty: 90 TABLET | Refills: 1 | Status: SHIPPED | OUTPATIENT
Start: 2023-06-07

## 2023-06-07 RX ORDER — NABUMETONE 500 MG/1
500 TABLET, FILM COATED ORAL 2 TIMES DAILY PRN
Qty: 180 TABLET | Refills: 1 | Status: SHIPPED | OUTPATIENT
Start: 2023-06-07 | End: 2023-12-04

## 2023-06-07 RX ORDER — HYDROCHLOROTHIAZIDE 25 MG/1
25 TABLET ORAL DAILY
Qty: 90 TABLET | Refills: 1 | Status: SHIPPED | OUTPATIENT
Start: 2023-06-07

## 2023-08-18 ENCOUNTER — HOSPITAL ENCOUNTER (OUTPATIENT)
Dept: MAMMOGRAPHY | Age: 73
Discharge: HOME | End: 2023-08-18
Attending: FAMILY MEDICINE
Payer: MEDICARE

## 2023-08-18 ENCOUNTER — HOSPITAL ENCOUNTER (OUTPATIENT)
Dept: BONE DENSITY | Age: 73
Discharge: HOME | End: 2023-08-18
Attending: FAMILY MEDICINE
Payer: MEDICARE

## 2023-08-18 DIAGNOSIS — Z12.31 ENCOUNTER FOR SCREENING MAMMOGRAM FOR MALIGNANT NEOPLASM OF BREAST: ICD-10-CM

## 2023-08-18 DIAGNOSIS — Z78.0 POSTMENOPAUSAL: ICD-10-CM

## 2023-08-18 PROCEDURE — 77067 SCR MAMMO BI INCL CAD: CPT | Performed by: FAMILY MEDICINE

## 2023-08-18 PROCEDURE — 77080 DXA BONE DENSITY AXIAL: CPT | Performed by: FAMILY MEDICINE

## 2023-08-18 PROCEDURE — 77063 BREAST TOMOSYNTHESIS BI: CPT | Performed by: FAMILY MEDICINE

## 2023-12-01 DIAGNOSIS — I10 ESSENTIAL HYPERTENSION: ICD-10-CM

## 2023-12-01 NOTE — TELEPHONE ENCOUNTER
Hypertension Medications Protocol [id number]/01/2023 05:53 AM   Protocol Details CMP or BMP in past 12 months    Last serum creatinine< 2.0    Appointment in past 6 or next 3 months        LOV 6/7/23     LAST LAB  11/18/22    LAST RX 6/7/23 90 with 1     Next OV   Future Appointments   Date Time Provider Graciela Loev   12/5/2023  1:40 PM Jared Alvarado MD [facility]         PROTOCOL failed

## 2023-12-02 RX ORDER — LOSARTAN POTASSIUM 100 MG/1
100 TABLET ORAL DAILY
Qty: 90 TABLET | Refills: 0 | Status: SHIPPED | OUTPATIENT
Start: 2023-12-02

## 2023-12-02 RX ORDER — HYDROCHLOROTHIAZIDE 25 MG/1
25 TABLET ORAL DAILY
Qty: 90 TABLET | Refills: 0 | Status: SHIPPED | OUTPATIENT
Start: 2023-12-02

## 2023-12-05 ENCOUNTER — OFFICE VISIT (OUTPATIENT)
Dept: FAMILY MEDICINE CLINIC | Facility: CLINIC | Age: 73
End: 2023-12-05
Payer: COMMERCIAL

## 2023-12-05 VITALS
RESPIRATION RATE: 16 BRPM | HEART RATE: 81 BPM | OXYGEN SATURATION: 99 % | WEIGHT: 145.25 LBS | TEMPERATURE: 98 F | HEIGHT: 57 IN | BODY MASS INDEX: 31.33 KG/M2 | SYSTOLIC BLOOD PRESSURE: 120 MMHG | DIASTOLIC BLOOD PRESSURE: 70 MMHG

## 2023-12-05 DIAGNOSIS — E66.9 OBESITY (BMI 30.0-34.9): ICD-10-CM

## 2023-12-05 DIAGNOSIS — Z23 NEED FOR VACCINATION: ICD-10-CM

## 2023-12-05 DIAGNOSIS — M47.22 OSTEOARTHRITIS OF SPINE WITH RADICULOPATHY, CERVICAL REGION: ICD-10-CM

## 2023-12-05 DIAGNOSIS — E78.00 PURE HYPERCHOLESTEROLEMIA: ICD-10-CM

## 2023-12-05 DIAGNOSIS — Z13.29 SCREENING FOR THYROID DISORDER: ICD-10-CM

## 2023-12-05 DIAGNOSIS — Z13.0 SCREENING FOR DEFICIENCY ANEMIA: ICD-10-CM

## 2023-12-05 DIAGNOSIS — I10 ESSENTIAL HYPERTENSION: Primary | ICD-10-CM

## 2023-12-05 PROCEDURE — 99214 OFFICE O/P EST MOD 30 MIN: CPT | Performed by: FAMILY MEDICINE

## 2023-12-05 PROCEDURE — 90471 IMMUNIZATION ADMIN: CPT | Performed by: FAMILY MEDICINE

## 2023-12-05 PROCEDURE — 3008F BODY MASS INDEX DOCD: CPT | Performed by: FAMILY MEDICINE

## 2023-12-05 PROCEDURE — 90662 IIV NO PRSV INCREASED AG IM: CPT | Performed by: FAMILY MEDICINE

## 2023-12-05 PROCEDURE — 3078F DIAST BP <80 MM HG: CPT | Performed by: FAMILY MEDICINE

## 2023-12-05 PROCEDURE — 3074F SYST BP LT 130 MM HG: CPT | Performed by: FAMILY MEDICINE

## 2023-12-05 RX ORDER — LOSARTAN POTASSIUM 100 MG/1
100 TABLET ORAL DAILY
Qty: 90 TABLET | Refills: 1 | Status: SHIPPED | OUTPATIENT
Start: 2023-12-05

## 2023-12-05 RX ORDER — NABUMETONE 500 MG/1
500 TABLET, FILM COATED ORAL DAILY
Qty: 90 TABLET | Refills: 1 | Status: SHIPPED | OUTPATIENT
Start: 2023-12-05

## 2023-12-05 RX ORDER — HYDROCHLOROTHIAZIDE 25 MG/1
25 TABLET ORAL DAILY
Qty: 90 TABLET | Refills: 1 | Status: SHIPPED | OUTPATIENT
Start: 2023-12-05

## 2023-12-14 LAB
ABSOLUTE BASOPHILS: 32 CELLS/UL (ref 0–200)
ABSOLUTE EOSINOPHILS: 492 CELLS/UL (ref 15–500)
ABSOLUTE LYMPHOCYTES: 1601 CELLS/UL (ref 850–3900)
ABSOLUTE MONOCYTES: 373 CELLS/UL (ref 200–950)
ABSOLUTE NEUTROPHILS: 2102 CELLS/UL (ref 1500–7800)
ALBUMIN/GLOBULIN RATIO: 1.6 (CALC) (ref 1–2.5)
ALBUMIN: 4.4 G/DL (ref 3.6–5.1)
ALKALINE PHOSPHATASE: 66 U/L (ref 37–153)
ALT: 11 U/L (ref 6–29)
AST: 16 U/L (ref 10–35)
BASOPHILS: 0.7 %
BILIRUBIN, TOTAL: 0.6 MG/DL (ref 0.2–1.2)
BUN: 15 MG/DL (ref 7–25)
CALCIUM: 9.7 MG/DL (ref 8.6–10.4)
CARBON DIOXIDE: 27 MMOL/L (ref 20–32)
CHLORIDE: 102 MMOL/L (ref 98–110)
CHOL/HDLC RATIO: 3.5 (CALC)
CHOLESTEROL, TOTAL: 201 MG/DL
CREATININE: 0.69 MG/DL (ref 0.6–1)
EGFR: 92 ML/MIN/1.73M2
EOSINOPHILS: 10.7 %
GLOBULIN: 2.8 G/DL (CALC) (ref 1.9–3.7)
GLUCOSE: 90 MG/DL (ref 65–99)
HDL CHOLESTEROL: 57 MG/DL
HEMATOCRIT: 35.4 % (ref 35–45)
HEMOGLOBIN: 11.7 G/DL (ref 11.7–15.5)
LDL-CHOLESTEROL: 118 MG/DL (CALC)
LYMPHOCYTES: 34.8 %
MCH: 28.7 PG (ref 27–33)
MCHC: 33.1 G/DL (ref 32–36)
MCV: 87 FL (ref 80–100)
MONOCYTES: 8.1 %
MPV: 11 FL (ref 7.5–12.5)
NEUTROPHILS: 45.7 %
NON-HDL CHOLESTEROL: 144 MG/DL (CALC)
PLATELET COUNT: 257 THOUSAND/UL (ref 140–400)
POTASSIUM: 4.1 MMOL/L (ref 3.5–5.3)
PROTEIN, TOTAL: 7.2 G/DL (ref 6.1–8.1)
RDW: 13.4 % (ref 11–15)
RED BLOOD CELL COUNT: 4.07 MILLION/UL (ref 3.8–5.1)
SODIUM: 139 MMOL/L (ref 135–146)
TRIGLYCERIDES: 143 MG/DL
TSH: 3.48 MIU/L (ref 0.4–4.5)
WHITE BLOOD CELL COUNT: 4.6 THOUSAND/UL (ref 3.8–10.8)

## 2024-05-20 ENCOUNTER — OFFICE VISIT (OUTPATIENT)
Dept: FAMILY MEDICINE CLINIC | Facility: CLINIC | Age: 74
End: 2024-05-20

## 2024-05-20 VITALS
HEIGHT: 57 IN | BODY MASS INDEX: 31.07 KG/M2 | SYSTOLIC BLOOD PRESSURE: 122 MMHG | OXYGEN SATURATION: 98 % | DIASTOLIC BLOOD PRESSURE: 64 MMHG | WEIGHT: 144 LBS | RESPIRATION RATE: 18 BRPM | TEMPERATURE: 98 F | HEART RATE: 85 BPM

## 2024-05-20 DIAGNOSIS — I10 ESSENTIAL HYPERTENSION: Primary | ICD-10-CM

## 2024-05-20 DIAGNOSIS — M47.22 OSTEOARTHRITIS OF SPINE WITH RADICULOPATHY, CERVICAL REGION: ICD-10-CM

## 2024-05-20 DIAGNOSIS — Z12.11 SCREENING FOR COLON CANCER: ICD-10-CM

## 2024-05-20 DIAGNOSIS — Z00.00 LABORATORY EXAMINATION ORDERED AS PART OF A ROUTINE GENERAL MEDICAL EXAMINATION: ICD-10-CM

## 2024-05-20 DIAGNOSIS — Z12.31 VISIT FOR SCREENING MAMMOGRAM: ICD-10-CM

## 2024-05-20 RX ORDER — HYDROCHLOROTHIAZIDE 25 MG/1
25 TABLET ORAL DAILY
Qty: 90 TABLET | Refills: 1 | Status: SHIPPED | OUTPATIENT
Start: 2024-05-20

## 2024-05-20 RX ORDER — LOSARTAN POTASSIUM 100 MG/1
100 TABLET ORAL DAILY
Qty: 90 TABLET | Refills: 1 | Status: SHIPPED | OUTPATIENT
Start: 2024-05-20

## 2024-05-20 RX ORDER — NABUMETONE 500 MG/1
500 TABLET, FILM COATED ORAL DAILY
Qty: 90 TABLET | Refills: 1 | Status: SHIPPED | OUTPATIENT
Start: 2024-05-20

## 2024-05-20 NOTE — PROGRESS NOTES
Karina Echavarria is a 73 year old female.  Chief Complaint   Patient presents with    Medication Follow-Up     HPI:   Karina Echavarria is a 73 year old female with hypertension seen for follow up and medication refill.    Has been compliant with her medication ans low salt diet.  Denies FLORES, blurred vision, dizziness, palpitations or swelling in the legs.    Arthritis pain is well controlled with nabumetone.    ALLERGY:   No Known Allergies    MEDICATIONS:     Current Outpatient Medications   Medication Sig Dispense Refill    nabumetone 500 MG Oral Tab Take 1 tablet (500 mg total) by mouth daily. 90 tablet 1    hydroCHLOROthiazide 25 MG Oral Tab Take 1 tablet (25 mg total) by mouth daily. 90 tablet 1    losartan 100 MG Oral Tab Take 1 tablet (100 mg total) by mouth daily. 90 tablet 1    Calcium Carbonate-Vitamin D (CALCIUM 600+D) 600-200 MG-UNIT Oral Tab Take by mouth.      aspirin 81 MG Oral Chew Tab Chew by mouth daily.        Past Medical History:    Back problem    cervical surgery    DJD (degenerative joint disease) of cervical spine    High blood pressure    HTN (hypertension)    Neuropathic arthritis    Left, SEES  AT [facility]    Osteoarthritis    Osteoarthritis of left wrist    Pain in joint, shoulder region    Seizure disorder (HCC)    had 1 15-20 years ago, never had one since      Social History:  Social History     Socioeconomic History    Marital status:      Spouse name: macario    Number of children: 3   Occupational History     Comment: RETIRED   Tobacco Use    Smoking status: Never     Passive exposure: Never    Smokeless tobacco: Never   Vaping Use    Vaping status: Never Used   Substance and Sexual Activity    Alcohol use: No    Drug use: No   Other Topics Concern    Caffeine Concern Yes     Comment: tea - 2 cups per a day    Exercise Yes     Comment: walking - everyday.     Seat Belt Yes   Social History Narrative    Balanced diet        REVIEW OF SYSTEMS:   GENERAL HEALTH:  feels well otherwise  SKIN: denies any unusual skin lesions or rashes  RESPIRATORY: denies shortness of breath with exertion  CARDIOVASCULAR: denies chest pain on exertion  GI: denies abdominal pain and denies heartburn  MUSCULOSKELETAL: chronic knee and shoulder pain  NEURO: denies headaches    EXAM:   /64   Pulse 85   Temp 97.9 °F (36.6 °C) (Temporal)   Resp 18   Ht 4' 9\" (1.448 m)   Wt 144 lb (65.3 kg)   SpO2 98%   BMI 31.16 kg/m²   GENERAL: obese,in no apparent distress  NECK: supple, no bruit  LUNGS: clear to auscultation  CARDIO: RRR without murmur  EXTREMITIES: no cyanosis, clubbing or edema    ASSESSMENT AND PLAN:   Fern was seen today for medication follow-up.    Diagnoses and all orders for this visit:    Essential hypertension  -     hydroCHLOROthiazide 25 MG Oral Tab; Take 1 tablet (25 mg total) by mouth daily.  -     losartan 100 MG Oral Tab; Take 1 tablet (100 mg total) by mouth daily.    Osteoarthritis of spine with radiculopathy, cervical region  -     nabumetone 500 MG Oral Tab; Take 1 tablet (500 mg total) by mouth daily.    Visit for screening mammogram  -     3D Mammogram Digital Screen, Bilateral (CPT=77067/78157); Future    Screening for colon cancer  -     Occult Blood, Fecal, FIT Immunoassay (Blue Cards)  -     Gastro Referral - In Network    Laboratory examination ordered as part of a routine general medical examination  -     Cancel: CBC With Differential With Platelet  -     Cancel: Assay, Thyroid Stim Hormone  -     Cancel: Lipid Panel  -     Cancel: Comp Metabolic Panel (14)  -     CBC With Differential With Platelet; Future  -     Assay, Thyroid Stim Hormone; Future  -     Comp Metabolic Panel (14); Future  -     Lipid Panel; Future  -     CBC With Differential With Platelet  -     Assay, Thyroid Stim Hormone  -     Comp Metabolic Panel (14)  -     Lipid Panel      Return in about 6 months (around 11/20/2024) for annual, HTN f/u.       The 21st Century Cures Act makes  medical notes like these available to patients in the interest of transparency. Please be advised this is a medical document. Medical documents are intended to carry relevant information, facts as evident, and the clinical opinion of the practitioner. The medical note is intended as peer to peer communication and may appear blunt or direct. It is written in medical language and may contain abbreviations or verbiage that are unfamiliar.

## 2024-08-28 ENCOUNTER — HOSPITAL ENCOUNTER (OUTPATIENT)
Dept: MAMMOGRAPHY | Age: 74
Discharge: HOME | End: 2024-08-28
Attending: FAMILY MEDICINE
Payer: COMMERCIAL

## 2024-08-28 DIAGNOSIS — Z12.31 VISIT FOR SCREENING MAMMOGRAM: ICD-10-CM

## 2024-08-28 PROCEDURE — 77067 SCR MAMMO BI INCL CAD: CPT | Performed by: FAMILY MEDICINE

## 2024-08-28 PROCEDURE — 77063 BREAST TOMOSYNTHESIS BI: CPT | Performed by: FAMILY MEDICINE

## 2024-11-25 DIAGNOSIS — I10 ESSENTIAL HYPERTENSION: ICD-10-CM

## 2024-11-27 DIAGNOSIS — M47.22 OSTEOARTHRITIS OF SPINE WITH RADICULOPATHY, CERVICAL REGION: ICD-10-CM

## 2024-11-27 LAB
ABSOLUTE BASOPHILS: 42 CELLS/UL (ref 0–200)
ABSOLUTE EOSINOPHILS: 414 CELLS/UL (ref 15–500)
ABSOLUTE LYMPHOCYTES: 1485 CELLS/UL (ref 850–3900)
ABSOLUTE MONOCYTES: 414 CELLS/UL (ref 200–950)
ABSOLUTE NEUTROPHILS: 2345 CELLS/UL (ref 1500–7800)
ALBUMIN/GLOBULIN RATIO: 1.5 (CALC) (ref 1–2.5)
ALBUMIN: 4.3 G/DL (ref 3.6–5.1)
ALKALINE PHOSPHATASE: 68 U/L (ref 37–153)
ALT: 12 U/L (ref 6–29)
AST: 20 U/L (ref 10–35)
BASOPHILS: 0.9 %
BILIRUBIN, TOTAL: 0.5 MG/DL (ref 0.2–1.2)
BUN: 19 MG/DL (ref 7–25)
CALCIUM: 9.3 MG/DL (ref 8.6–10.4)
CARBON DIOXIDE: 28 MMOL/L (ref 20–32)
CHLORIDE: 104 MMOL/L (ref 98–110)
CHOL/HDLC RATIO: 3 (CALC)
CHOLESTEROL, TOTAL: 198 MG/DL
CREATININE: 0.72 MG/DL (ref 0.6–1)
EGFR: 88 ML/MIN/1.73M2
EOSINOPHILS: 8.8 %
GLOBULIN: 2.8 G/DL (CALC) (ref 1.9–3.7)
GLUCOSE: 92 MG/DL (ref 65–99)
HDL CHOLESTEROL: 65 MG/DL
HEMATOCRIT: 34.8 % (ref 35–45)
HEMOGLOBIN: 11.7 G/DL (ref 11.7–15.5)
LDL-CHOLESTEROL: 112 MG/DL (CALC)
LYMPHOCYTES: 31.6 %
MCH: 30 PG (ref 27–33)
MCHC: 33.6 G/DL (ref 32–36)
MCV: 89.2 FL (ref 80–100)
MONOCYTES: 8.8 %
MPV: 11 FL (ref 7.5–12.5)
NEUTROPHILS: 49.9 %
NON-HDL CHOLESTEROL: 133 MG/DL (CALC)
PLATELET COUNT: 249 THOUSAND/UL (ref 140–400)
POTASSIUM: 4.2 MMOL/L (ref 3.5–5.3)
PROTEIN, TOTAL: 7.1 G/DL (ref 6.1–8.1)
RDW: 12.4 % (ref 11–15)
RED BLOOD CELL COUNT: 3.9 MILLION/UL (ref 3.8–5.1)
SODIUM: 140 MMOL/L (ref 135–146)
TRIGLYCERIDES: 99 MG/DL
TSH: 4.28 MIU/L (ref 0.4–4.5)
WHITE BLOOD CELL COUNT: 4.7 THOUSAND/UL (ref 3.8–10.8)

## 2024-11-28 RX ORDER — HYDROCHLOROTHIAZIDE 25 MG/1
25 TABLET ORAL DAILY
Qty: 90 TABLET | Refills: 3 | Status: SHIPPED | OUTPATIENT
Start: 2024-11-28

## 2024-11-28 RX ORDER — LOSARTAN POTASSIUM 100 MG/1
100 TABLET ORAL DAILY
Qty: 90 TABLET | Refills: 3 | Status: SHIPPED | OUTPATIENT
Start: 2024-11-28

## 2024-11-28 NOTE — TELEPHONE ENCOUNTER
Refill passed per [facility] protocol.    Requested Prescriptions   Pending Prescriptions Disp Refills    HYDROCHLOROTHIAZIDE 25 MG Oral Tab [Pharmacy Med Name: HYDROCHLOROTHIAZIDE 25MG TABLETS] 90 tablet 1     Sig: TAKE 1 TABLET(25 MG) BY MOUTH DAILY       Hypertension Medications Protocol Passed - 11/28/2024  8:09 AM        Passed - CMP or BMP in past 12 months        Passed - Last BP reading less than 140/90     BP Readings from Last 1 Encounters:   05/20/24 122/64               Passed - In person appointment or virtual visit in the past 12 mos or appointment in next 3 mos     Recent Outpatient Visits              6 months ago Essential hypertension    [facility], [address] [place], [place] [address]Han Madhavi, MD    Office Visit    11 months ago Essential hypertension    [facility] [address] [place], [place] [address]Han Madhavi, MD    Office Visit    1 year ago Routine general medical examination at a health care facility    [facility] [address] [place], [place] [address]Han Madhavi, MD    Office Visit    1 year ago Essential hypertension    [facility] [address] [place], [place] [address]Han Madhavi, MD    Office Visit    2 years ago Pure hypercholesterolemia    [facility] [address] [place], [place] [address]Han Madhavi, MD    Office Visit          Future Appointments         Provider Department Appt Notes    In 5 days Janna Washington MD [facility], [address] [place] [address] 6 month follow up and flu shot                    Passed - EGFRCR or GFRNAA > 50     GFR Evaluation  EGFRCR: 88 , resulted on 11/26/2024            LOSARTAN 100 MG Oral Tab [Pharmacy Med Name: LOSARTAN 100MG TABLETS] 90 tablet 1     Sig: TAKE 1 TABLET(100 MG) BY MOUTH DAILY       Hypertension Medications Protocol Passed - 11/28/2024  8:09 AM        Passed - CMP or BMP in past 12 months        Passed - Last BP reading less than 140/90      BP Readings from Last 1 Encounters:   05/20/24 122/64               Passed - In person appointment or virtual visit in the past 12 mos or appointment in next 3 mos     Recent Outpatient Visits              6 months ago Essential hypertension    [facility] [address] [place], [place] [address]Han Madhavi, MD    Office Visit    11 months ago Essential hypertension    [facility] [address] [place], [place] [address]Han Madhavi, MD    Office Visit    1 year ago Routine general medical examination at a health care facility    [facility] [address] [place], [place] [address]Han Madhavi, MD    Office Visit    1 year ago Essential hypertension    [facility] [address] [place], [place] [address]Han Madhavi, MD    Office Visit    2 years ago Pure hypercholesterolemia    [facility] [address] [place], [place] [address]Han Madhavi, MD    Office Visit          Future Appointments         Provider Department Appt Notes    In 5 days Janna Washington MD [address] 6 month follow up and flu shot                    Passed - EGFRCR or GFRNAA > 50     GFR Evaluation  EGFRCR: 88 , resulted on 11/26/2024             Future Appointments         Provider Department Appt Notes    In 5 days Janna Washington MD [address] 6 month follow up and flu shot          Recent Outpatient Visits              6 months ago Essential hypertension    [facility] [address] [place], [place] [address]Han Madhavi, MD    Office Visit    11 months ago Essential hypertension    [facility] [address] [place], [place] [address]Han Madhavi, MD    Office Visit    1 year ago Routine general medical examination at a health care facility    [facility] [address] [place], [place] [address]Han Madhavi, MD    Office Visit    1 year ago Essential hypertension    [facility], [address] [place] [address], Janna Pandey MD    Office Visit    2 years ago Pure hypercholesterolemia    [facility], [address] [place] [address], Janna Pandey MD    Office Visit

## 2024-12-02 NOTE — TELEPHONE ENCOUNTER
Please review.   Appointment tomorrow 12/3/24  Continue medication?    Future Appointments   Date Time Provider Department Center   12/3/2024 11:00 AM Janna Washington MD [facility]      Requested Prescriptions   Pending Prescriptions Disp Refills    NABUMETONE 500 MG Oral Tab [Pharmacy Med Name: NABUMETONE 500MG TABLETS] 90 tablet 1     Sig: TAKE 1 TABLET(500 MG) BY MOUTH DAILY       Non-Narcotic Pain Medication Protocol Passed - 12/2/2024 11:18 AM        Passed - In person appointment or virtual visit in the past 6 mos or appointment in next 3 mos     Recent Outpatient Visits              6 months ago Essential hypertension    [address], Janna Pandey MD    Office Visit    12 months ago Essential hypertension    [address]    Office Visit    1 year ago Routine general medical examination at a health care facility    [address], Janna Pandey MD    Office Visit    1 year ago Essential hypertension    [address] Janna Pandey MD    Office Visit    2 years ago Pure hypercholesterolemia    [address] Janna Pandey MD    Office Visit          Future Appointments         Provider Department Appt Notes    Tomorrow Janna Washington MD [address] 6 month follow up and flu shot                       Future Appointments         Provider Department Appt Notes    Tomorrow Janna Washington MD [address] 6 month follow up and flu shot          Recent Outpatient Visits              6 months ago Essential hypertension    [address] Janna Pandey MD    Office Visit    12 months ago Essential hypertension    [facility]  [facility], [address] [place] [address], Janna Pandey MD    Office Visit    1 year ago Routine general medical examination at a health care facility    [facility], [address] [place] [address], Janna Pandey MD    Office Visit    1 year ago Essential hypertension    [facility], [address] [place] [address], Janna Pandey MD    Office Visit    2 years ago Pure hypercholesterolemia    [facility], [address] [place] [address], Janna Pandey MD    Office Visit

## 2024-12-03 ENCOUNTER — HOSPITAL ENCOUNTER (OUTPATIENT)
Dept: GENERAL RADIOLOGY | Age: 74
Discharge: HOME | End: 2024-12-03
Attending: FAMILY MEDICINE
Payer: COMMERCIAL

## 2024-12-03 ENCOUNTER — OFFICE VISIT (OUTPATIENT)
Dept: FAMILY MEDICINE CLINIC | Facility: CLINIC | Age: 74
End: 2024-12-03
Payer: COMMERCIAL

## 2024-12-03 VITALS
RESPIRATION RATE: 16 BRPM | HEIGHT: 57 IN | WEIGHT: 147.25 LBS | OXYGEN SATURATION: 99 % | DIASTOLIC BLOOD PRESSURE: 74 MMHG | HEART RATE: 86 BPM | BODY MASS INDEX: 31.77 KG/M2 | SYSTOLIC BLOOD PRESSURE: 124 MMHG | TEMPERATURE: 97 F

## 2024-12-03 DIAGNOSIS — G89.29 CHRONIC PAIN OF RIGHT KNEE: ICD-10-CM

## 2024-12-03 DIAGNOSIS — M47.22 OSTEOARTHRITIS OF SPINE WITH RADICULOPATHY, CERVICAL REGION: ICD-10-CM

## 2024-12-03 DIAGNOSIS — Z23 NEED FOR VACCINATION: ICD-10-CM

## 2024-12-03 DIAGNOSIS — I10 ESSENTIAL HYPERTENSION: Primary | ICD-10-CM

## 2024-12-03 DIAGNOSIS — M25.561 CHRONIC PAIN OF RIGHT KNEE: ICD-10-CM

## 2024-12-03 PROCEDURE — 90471 IMMUNIZATION ADMIN: CPT | Performed by: FAMILY MEDICINE

## 2024-12-03 PROCEDURE — 99213 OFFICE O/P EST LOW 20 MIN: CPT | Performed by: FAMILY MEDICINE

## 2024-12-03 PROCEDURE — 90662 IIV NO PRSV INCREASED AG IM: CPT | Performed by: FAMILY MEDICINE

## 2024-12-03 PROCEDURE — 73562 X-RAY EXAM OF KNEE 3: CPT | Performed by: FAMILY MEDICINE

## 2024-12-03 RX ORDER — NABUMETONE 500 MG/1
500 TABLET, FILM COATED ORAL DAILY
Qty: 90 TABLET | Refills: 3 | Status: SHIPPED | OUTPATIENT
Start: 2024-12-03

## 2024-12-03 RX ORDER — HYDROCHLOROTHIAZIDE 25 MG/1
25 TABLET ORAL DAILY
Qty: 90 TABLET | Refills: 3 | Status: SHIPPED | OUTPATIENT
Start: 2024-12-03

## 2024-12-03 RX ORDER — NABUMETONE 500 MG/1
500 TABLET, FILM COATED ORAL DAILY
Qty: 90 TABLET | Refills: 0 | Status: SHIPPED | OUTPATIENT
Start: 2024-12-03 | End: 2024-12-03

## 2024-12-03 RX ORDER — LOSARTAN POTASSIUM 100 MG/1
100 TABLET ORAL DAILY
Qty: 90 TABLET | Refills: 3 | Status: SHIPPED | OUTPATIENT
Start: 2024-12-03

## 2024-12-03 NOTE — PROGRESS NOTES
Family Medicine Progress Note  ASSESSMENT AND PLAN:  Karina Echavarria is a 74 year old female who is here for:     Fern was seen today for follow - up.    Diagnoses and all orders for this visit:    Essential hypertension controlled  -     hydroCHLOROthiazide 25 MG Oral Tab; Take 1 tablet (25 mg total) by mouth daily.  -     losartan 100 MG Oral Tab; Take 1 tablet (100 mg total) by mouth daily.  -    continue the same dose of medication  -     DASH diet, limit salt to less than 2000 mg  -     Goal BP less than 130/80    Need for vaccination  -     INFLUENZA VAC HIGH DOSE PRSV FREE    Chronic pain of right knee  -     XR KNEE (3 VIEWS), RIGHT (CPT=73562); Future  -     Ortho Referral - In Network  -     advised ice, elevation, ace wrap    Osteoarthritis of spine with radiculopathy, cervical region  -     nabumetone 500 MG Oral Tab; Take 1 tablet (500 mg total) by mouth daily.      The patient indicates understanding of these issues and agrees to the plan.  Follow-Up: The patient is asked to return in Return in about 1 year (around 12/3/2025)for HTN f/u., or in 1 week if knee pain is worsening,   .     Janna Washington MD   12/03/24      CC: Follow - Up    HPI:   Karina Echavarria is a 74 year old female who presents for Follow - Up     Has been compliant with her medication ans low salt diet. Tolerating medication well without any side effects.  Denies FLORES, blurred vision, dizziness, palpitations or swelling in the legs.    Neck pain is well controlled with the Nabumetone, complaining of worsening right knee pain for the past couple of months, + swelling, pain is worse with walking at times.    States did her colonoscopy with  in September, was told to repeat in 1 year  as the clense was not good.    ALLERGY:     Allergies as of 12/03/2024    (No Known Allergies)     MEDICATIONS:     Current Outpatient Medications   Medication Sig Dispense Refill    hydroCHLOROthiazide 25 MG Oral Tab Take 1 tablet (25 mg  total) by mouth daily. 90 tablet 3    losartan 100 MG Oral Tab Take 1 tablet (100 mg total) by mouth daily. 90 tablet 3    nabumetone 500 MG Oral Tab Take 1 tablet (500 mg total) by mouth daily. 90 tablet 3    Calcium Carbonate-Vitamin D (CALCIUM 600+D) 600-200 MG-UNIT Oral Tab Take by mouth.      aspirin 81 MG Oral Chew Tab Chew by mouth daily.        Past Medical History:    Back problem    cervical surgery    DJD (degenerative joint disease) of cervical spine    High blood pressure    HTN (hypertension)    Neuropathic arthritis    Left, SEES  AT [facility]    Osteoarthritis    Osteoarthritis of left wrist    Pain in joint, shoulder region    Seizure disorder (HCC)    had 1 15-20 years ago, never had one since      Social History:  Social History     Socioeconomic History    Marital status:      Spouse name: macario    Number of children: 3   Occupational History     Comment: RETIRED   Tobacco Use    Smoking status: Never     Passive exposure: Never    Smokeless tobacco: Never   Vaping Use    Vaping status: Never Used   Substance and Sexual Activity    Alcohol use: No    Drug use: No   Other Topics Concern    Caffeine Concern Yes     Comment: tea - 2 cups per a day    Exercise Yes     Comment: walking - everyday.     Seat Belt Yes   Social History Narrative    Balanced diet        REVIEW OF SYSTEMS:   A comprehensive 10 point review of systems was completed.  Pertinent positives and negatives noted in the the HPI.      EXAM:   /74   Pulse 86   Temp 97.3 °F (36.3 °C) (Temporal)   Resp 16   Ht 4' 9\" (1.448 m)   Wt 147 lb 4 oz (66.8 kg)   SpO2 99%   BMI 31.86 kg/m²   GENERAL: obese,in no apparent distress  SKIN: no rashes,no suspicious lesions  HEENT: atraumatic, normocephalic,ears and throat are clear  NECK: supple,no adenopathy,no bruits  LUNGS: clear to auscultation  CARDIO: RRR without murmur  GI: good BS's,no masses, HSM or tenderness  EXTREMITIES: no cyanosis, clubbing or edema  KNEE:  right, + swelling, + tenderness medial joint line, ROM is painful  NEURO: gait antalgic.      NOTE TO PATIENT: The 21st Century Cures Act makes clinical notes like these available to patients in the interest of transparency. Clinical notes are medical documents used by physicians and care providers to communicate with each other. These documents include medical language and terminology, abbreviations, and treatment information that may sound technical and at times possibly unfamiliar. In addition, at times, the verbiage may appear blunt or direct. These documents are one tool providers use to communicate relevant information and clinical opinions of the care providers in a way that allows common understanding of the clinical context.      Janna Washington MD    12/03/24 12:26 PM

## 2024-12-11 ENCOUNTER — TELEPHONE (OUTPATIENT)
Dept: FAMILY MEDICINE CLINIC | Facility: CLINIC | Age: 74
End: 2024-12-11

## 2024-12-11 NOTE — TELEPHONE ENCOUNTER
Patient called requesting xray results,   Relayed results and recommendations     Tricompartmental osteoarthritis, please advise patient to follow up with ortho, referral provided at appointment.     Sheridan Robles RN  12/11/2024 10:16 AM CST       Called and spoke with pt and pt's spouse. Pt stated ok for me to speak with her spouse. Notified of results and recommendations. Stated they have ortho apt already. They are ok with apt as scheduled. No further questions.     Future Appointments  1/31/2025  10:00 AM   Justo Coronado MD             [facility]        [facility]  12/10/2025 10:00 AM   Janna Washington MD      [facility]              [facility]     FYI Dr. Washington - Scheduled with ortho above

## 2025-04-29 ENCOUNTER — PATIENT MESSAGE (OUTPATIENT)
Dept: FAMILY MEDICINE CLINIC | Facility: CLINIC | Age: 75
End: 2025-04-29

## 2025-04-29 DIAGNOSIS — Z12.11 ENCOUNTER FOR SCREENING COLONOSCOPY: Primary | ICD-10-CM

## 2025-05-20 ENCOUNTER — OFFICE VISIT (OUTPATIENT)
Dept: FAMILY MEDICINE CLINIC | Facility: CLINIC | Age: 75
End: 2025-05-20
Payer: COMMERCIAL

## 2025-05-20 ENCOUNTER — NURSE TRIAGE (OUTPATIENT)
Dept: FAMILY MEDICINE CLINIC | Facility: CLINIC | Age: 75
End: 2025-05-20

## 2025-05-20 VITALS
SYSTOLIC BLOOD PRESSURE: 130 MMHG | TEMPERATURE: 98 F | OXYGEN SATURATION: 98 % | HEART RATE: 86 BPM | DIASTOLIC BLOOD PRESSURE: 80 MMHG | HEIGHT: 57 IN | BODY MASS INDEX: 31.28 KG/M2 | WEIGHT: 145 LBS | RESPIRATION RATE: 18 BRPM

## 2025-05-20 DIAGNOSIS — T23.272A SECOND DEGREE BURN OF LEFT WRIST AND HAND, INITIAL ENCOUNTER: Primary | ICD-10-CM

## 2025-05-20 DIAGNOSIS — T23.202A SECOND DEGREE BURN OF LEFT WRIST AND HAND, INITIAL ENCOUNTER: Primary | ICD-10-CM

## 2025-05-20 PROCEDURE — 99213 OFFICE O/P EST LOW 20 MIN: CPT | Performed by: FAMILY MEDICINE

## 2025-05-20 RX ORDER — SILVER SULFADIAZINE 10 MG/G
1 CREAM TOPICAL 2 TIMES DAILY
Qty: 30 G | Refills: 0 | Status: SHIPPED | OUTPATIENT
Start: 2025-05-20

## 2025-05-20 NOTE — TELEPHONE ENCOUNTER
Spoke with spouse, informed him Dr. Washington needs to evaluated burn before prescribing medication. Spouse verbalized understanding and confirmed appointment today at 340 pm.

## 2025-05-20 NOTE — PROGRESS NOTES
Family Medicine Progress Note  ASSESSMENT AND PLAN:  Karina Echavarria is a 74 year old female who is here for:      Fern was seen today for trauma.    Diagnoses and all orders for this visit:    Second degree burn of left wrist and hand, initial encounter  Second degree burn, advised silvadene, keep wound clean, if pain worsens to follow up.  -     silver sulfADIAZINE 1 % External Cream; Apply 1 Application topically 2 (two) times daily.        The patient indicates understanding of these issues and agrees to the plan.  Follow-Up: The patient is asked to return in Return if symptoms worsen or fail to improve.  .     Janna Washington MD        CC: Trauma     The following individual(s) verbally consented to be recorded using ambient AI listening technology and understand that they can each withdraw their consent to this listening technology at any point by asking the clinician to turn off or pause the recording:    Patient name: Karina Echavarria  Additional names: Caden France    HPI:     History of Present Illness      Karina Echavarria is a 74 year old female presents today with complaints of burn on her left wrist and hand.    States was cooking and hot oil splashed on her left hand and wrist 2 days ago, has been, had a blister that popped spontaneously, has been applying vaseline, states has some pain and redness around the burn wound.      ALLERGY:     Allergies as of 05/20/2025    (No Known Allergies)       MEDICATIONS:     Current Medications[1]   Past Medical History[2]   Social History:  Short Social Hx on File[3]     REVIEW OF SYSTEMS:   ROS as documented in HPI    EXAM:   /80   Pulse 86   Temp 97.8 °F (36.6 °C) (Temporal)   Resp 18   Ht 4' 9\" (1.448 m)   Wt 145 lb (65.8 kg)   SpO2 98%   BMI 31.38 kg/m²   GENERAL: well developed, well nourished,in no apparent distress  SKIN: erythema with some sloughing on the dorsum of left hand radial aspect and wrist.  LUNGS: clear to  auscultation  CARDIO: RRR without murmur      NOTE TO PATIENT: The 21st Century Cures Act makes clinical notes like these available to patients in the interest of transparency. Clinical notes are medical documents used by physicians and care providers to communicate with each other. These documents include medical language and terminology, abbreviations, and treatment information that may sound technical and at times possibly unfamiliar. In addition, at times, the verbiage may appear blunt or direct. These documents are one tool providers use to communicate relevant information and clinical opinions of the care providers in a way that allows common understanding of the clinical context.      Janna Washington MD           [1]   Current Outpatient Medications   Medication Sig Dispense Refill    silver sulfADIAZINE 1 % External Cream Apply 1 Application topically 2 (two) times daily. 30 g 0    hydroCHLOROthiazide 25 MG Oral Tab Take 1 tablet (25 mg total) by mouth daily. 90 tablet 3    losartan 100 MG Oral Tab Take 1 tablet (100 mg total) by mouth daily. 90 tablet 3    nabumetone 500 MG Oral Tab Take 1 tablet (500 mg total) by mouth daily. 90 tablet 3    Calcium Carbonate-Vitamin D (CALCIUM 600+D) 600-200 MG-UNIT Oral Tab Take by mouth.      aspirin 81 MG Oral Chew Tab Chew by mouth daily.     [2]   Past Medical History:   Back problem    cervical surgery    DJD (degenerative joint disease) of cervical spine    High blood pressure    HTN (hypertension)    Neuropathic arthritis    Left, SEES  AT [facility]    Osteoarthritis    Osteoarthritis of left wrist    Pain in joint, shoulder region    Seizure disorder (HCC)    had 1 15-20 years ago, never had one since   [3]   Social History  Socioeconomic History    Marital status:      Spouse name: macario    Number of children: 3   Occupational History     Comment: RETIRED   Tobacco Use    Smoking status: Never     Passive exposure: Never    Smokeless tobacco:  Never   Vaping Use    Vaping status: Never Used   Substance and Sexual Activity    Alcohol use: No    Drug use: No   Other Topics Concern    Caffeine Concern Yes     Comment: tea - 2 cups per a day    Exercise Yes     Comment: walking - everyday.     Seat Belt Yes   Social History Narrative    Balanced diet     Social Drivers of Health     Food Insecurity: No Food Insecurity (5/20/2025)    NCSS - Food Insecurity     Worried About Running Out of Food in the Last Year: No     Ran Out of Food in the Last Year: No   Transportation Needs: No Transportation Needs (5/20/2025)    NCSS - Transportation     Lack of Transportation: No   Housing Stability: Not At Risk (5/20/2025)    NCSS - Housing/Utilities     Has Housing: Yes     Worried About Losing Housing: No     Unable to Get Utilities: No

## 2025-05-20 NOTE — TELEPHONE ENCOUNTER
Action Requested: Summary for Provider     []  Critical Lab, Recommendations Needed  [] Need Additional Advice  []   FYI    []   Need Orders  [x] Need Medications Sent to Pharmacy  []  Other     SUMMARY: Same day appt scheduled for evaluation    Reason for call: Burn, Minor  Onset: Yesterday    Pt's spouse Caden called stating pt has a burn from cooking oil that splashed on her hand. He is asking for Rx for this. Advised same day evaluation for this. Appt scheduled today, but spouse prefers to speak to PCP for Rx only.    Reason for Disposition   Chemical on skin that causes a blister    Protocols used: Burns-A-OH

## (undated) DIAGNOSIS — I10 ESSENTIAL HYPERTENSION: ICD-10-CM

## (undated) DEVICE — GOWN,SIRUS,FABRIC-REINFORCED,X-LARGE: Brand: MEDLINE

## (undated) DEVICE — GLOVE ALOETOUCH ORTHO SZ 8

## (undated) DEVICE — MODEL PSI BONE

## (undated) DEVICE — SOL  .9 3000ML

## (undated) DEVICE — DRAPE,U/SHT,SPLIT,FILM,60X84,STERILE: Brand: MEDLINE

## (undated) DEVICE — ZIMMER® STERILE DISPOSABLE TOURNIQUET CUFF WITH PLC, DUAL PORT, SINGLE BLADDER, 34 IN. (86 CM)

## (undated) DEVICE — SUTURE VICRYL 2-0 FSL

## (undated) DEVICE — 2T11 #2 PDO 36 X 36: Brand: 2T11 #2 PDO 36 X 36

## (undated) DEVICE — Device: Brand: STABLECUT®

## (undated) DEVICE — SOL  .9 1000ML BTL

## (undated) DEVICE — Device: Brand: POWER-FLO®

## (undated) DEVICE — TOTAL KNEE CDS: Brand: MEDLINE INDUSTRIES, INC.

## (undated) DEVICE — TISSEEL SPRAY SET

## (undated) DEVICE — HOOD, PEEL-AWAY: Brand: FLYTE

## (undated) DEVICE — WRAP COOLING KNEE W/ICE PILLOW

## (undated) DEVICE — STERILE POLYISOPRENE POWDER-FREE SURGICAL GLOVES: Brand: PROTEXIS

## (undated) DEVICE — SUTURE ETHIBOND 1 OS-6

## (undated) DEVICE — CONVERTORS STOCKINETTE: Brand: CONVERTORS

## (undated) DEVICE — GLOVE SURG SENSICARE SZ 8-1/2

## (undated) DEVICE — DRESSING AQUACEL AG 3.5X12

## (undated) DEVICE — STERILE PATIENT PROTECTIVE PAD FOR IMP® KNEE POSITIONERS & COHESIVE WRAP (10 / CASE): Brand: DE MAYO KNEE POSITIONER®

## (undated) DEVICE — SUTURE VICRYL 0 CP-1

## (undated) DEVICE — GLOVE SURG TRIUMPH SZ 8

## (undated) DEVICE — KENDALL SCD EXPRESS SLEEVES, KNEE LENGTH, MEDIUM: Brand: KENDALL SCD

## (undated) NOTE — IP AVS SNAPSHOT
Patient Demographics     Address  [address] [place] [address] Phone  [phone] [facility])  [phone] [facility]      Emergency Contact(s)     Name Relation Home Work Sarah Daughter [phone]    CAROLYN [place] ? Do NOT put a pillow under your knee as it may be more difficult to straighten afterwards. No smoking  ? Avoid smoking. It is known to cause breathing problems and can decrease the rate of healing. Incision care/Dressing changes  ?  Wash hands before ? You may need pain medication regularly (every 4-6 hours) the first 2 weeks and then begin to decrease how often you are taking it. ? Take pain medication as prescribed with food, especially before therapy, allowing 30-60 minutes to take effect.   ? Do no hand  as soon as they walk in your house-whether they live there or are visiting. ? Keep bed linen/clothing freshly laundered. ? Do not allow others or pets to touch your incision. ? Avoid people that have colds or the flu. ?  Your surgeon KILEY ? Pain, excessive tenderness, redness, or swelling in leg or calf (other than incision site). (CALL SURGEON)      Go directly to the ER or CALL 911 if  you:  ? become short of breath  ? have chest pain  ?  cough up blood  ? have unexplained anxiety with br [address] [facility] [address] [phone]                  Your medication list      TAKE these medications       Instructions Authorizing Provider Morning Afternoon Evening As Needed   apixaban 2.5 MG Tabs  Commonly known as: [id number] docusate sodium (COLACE) cap 100 mg 10/17/17 2025 Given      [id number] docusate sodium (COLACE) cap 100 mg 10/18/17 0902 Given      [id number] ferrous sulfate EC tab 325 mg 10/18/17 0902 Given      [id number] losartan (COZAAR) 100 mg, hydrochlorot Ordering provider:  Gavi Ruvalcaba MD  10/18/17 1035 Resulting lab:  [facility]    Specimen Information    Type Source Collected On   Blood — 10/18/17 1330          Components    Component Value Reference Range Flag Lab   Magnesium 2.1 1.7 - 3.0 mg/dL — E Author:  Veronika Mendez MD Service:  Hospitalist Author Type:  Physician    Filed:  10/16/2017  3:42 PM Date of Service:  10/16/2017  2:28 PM Status:  Signed    :  Veronika Mendez MD (Physician)     Consult Orders:    1.  Consult to Hospitalist Once [ Allergies:[NB.1] No Known Allergies[NB.2]    Medications:[NB.1]    No current facility-administered medications on file prior to encounter.    Current Outpatient Prescriptions on File Prior to Encounter:  Losartan Potassium-HCTZ 100-25 MG Oral Tab Take 1 ta 1. Left knee OA s/p left TKA POD 0  1. Pain control   2. eliquis  3. PT OT  4. Ortho following   2. Nausea  1. zofran PRN  3. HTN   1. Resume home med today  4. obesity[NB.3]      Quality:  · DVT Prophylaxis:[NB.1] eliquis[NB. 3]  · CODE status:[NB.1] full[ Past Surgical History:  No date: HYSTERECTOMY  2001: OTHER SURGICAL HISTORY      Comment: C spine surgery  No date: TUBAL LIGATION    SUBJECTIVE  Agrees to exercise    Patient’s self-stated goal is to return home    OBJECTIVE  Precautions:  need with min assist for L LE. Able to negotiate 4 steps with one rail and SPC and cga. Verbal cues for sequencing. Ambulated a total of 50 ft with rw with step to gait pattern, cues to increase heel strike with supervision.  Instructed  and daughter that Goal #5  AROM 0 degrees extension to 95 degrees flexion      Goal #6        Goal Comments: Goals established on 10/16/2017. Ongoing for 10/18/17[KT. 1]         Attribution Nichols    KT. 1 - Ann Simmonds, PT on 10/18/2017  2:30 PM               Physical Ther Location: L knee  Management Techniques: Activity promotion; Body mechanics;Breathing techniques;Repositioning[CY. 3]    BALANCE[CY.1]  Static Sitting: Fair -  Dynamic Sitting: Fair -  Static Standing: Fair -  Dynamic Standing: Poor +[CY. 3]    ACTIVITY NEVILLE brenda patrick RW and min A with chair follow. Pt returned to [facility] chair, wheeled back to room. PM: pt performed TKA protocol therex c increased reps, daughter present to translate. Pt gait trained on stairs c B HR , cues for sequencing, min A.  Pt brenda patrick RW min A , c At this time, Pt. presents with decreased balance, impaired strength, difficulty with gait/transfers resulting in downgrade of overall functional mobility.   Due to above deficits, Pt will benefit from continued IP PT, so that patient may achieve highest fu Evaluation Date: 10/16/2017  Type of Evaluation: Initial  Physician Order: PT Eval and Treat    Presenting Problem: s/p L TKA  Reason for Therapy: Mobility Dysfunction and Discharge Planning    History related to current admission: Pt is 78year old female Precautions:  Other (Comment) (speaks Tajik)  Fall Risk: High fall risk    WEIGHT BEARING RESTRICTION  Weight Bearing Restriction: L lower extremity           L Lower Extremity: Weight Bearing as Tolerated    PAIN ASSESSMENT  Ratin  Location: L knee  M -   Moving to and from a bed to a chair (including a wheelchair)?: A Little   -   Need to walk in hospital room?: A Little   -   Climbing 3-5 steps with a railing?: A Lot       AM-PAC Score:  Raw Score: 17   PT Approx Degree of Impairment Score: 50.57%   S These impairments and comorbidities manifest themselves as functional limitations in independent bed mobility, transfers, gait and stoop negotiation.   The patient is below baseline and would benefit from skilled inpatient PT to address the above deficits t AE.1 Deepti Joyner, PT on 10/16/2017  2:37 PM                        Occupational Therapy Notes (last 72 hours) (Notes from 10/15/2017  5:11 PM through 10/18/2017  5:11 PM)      Occupational Therapy Note signed by Campos Reyes OT at 10/18/2017  1 Management Techniques: Relaxation;Repositioning     ACTIVITY TOLERANCE  Room air  No shortness of breath    ACTIVITIES OF DAILY LIVING ASSESSMENT  AM-PAC ‘6-Clicks’ Inpatient Daily Activity Short Form  How much help from another person does the patient cur Recommend DC from OT services and home at discharge with family assistance. OT Discharge Recommendations: Home       PLAN  OT Treatment Plan: Energy conservation/work simplification techniques;ADL training;Functional transfer training; Endurance trainin • DJD (degenerative joint disease) of cervical spine    • High blood pressure    • HTN (hypertension)    • Neuropathic arthritis     Left, SEES [facility]   • Osteoarthritis    • Osteoarthritis of left wrist    • Pain in joint, shoulder region ACTIVITY TOLERANCE   On RA, O2 sat 97%   at rest; 137 up to 144 with transfers    [address] ‘6-Clicks’ Inpatient Daily Activity Short Form  How much help from another person does the patient currently need…[MM. 1] care,  instrumental activities of daily living, rest and sleep, work, leisure and social participation.      The patient is functioning below her previous functional level and would benefit from skilled inpatient OT to address the above deficits, maximizing No notes of this type exist for this encounter.             Immunizations     Name Date      INFLUENZA 01/27/17     INFLUENZA 11/15/14     Kenalog Per 10mg Inj 03/18/16

## (undated) NOTE — LETTER
[facility]ale[facility] Testing Department  Phone: [phone]  Right Fax: [phone]  [facility] 20 By:  Ariadne Casiano RN Date: 10/9/17    Patient Name: Sweta Anel  Surgery Date: 10/16/2017    CSN: [id number]  Medical Record: [id number]

## (undated) NOTE — LETTER
04/18/19        [address] [address]      Dear Tono Dow,    Our records indicate that you have outstanding lab work and or testing that was ordered for you and has not yet been completed:  Orders Placed

## (undated) NOTE — LETTER
03/14/18        Pool Segura  [facility]      Dear Ana Pollen records indicate that you have outstanding lab work and or testing that was ordered for you and has not yet been completed:          Sarah Alberts

## (undated) NOTE — LETTER
Me[facility] Lopez Testing Department  Phone: [phone]  Right Fax: [phone]  [facility] 20 By: SARA Reinoso RN Date: 10/10/17    Patient Name: Maryan Ramos  Surgery Date: 10/16/2017    CSN: [id number]  Medical Record: [id number]

## (undated) NOTE — LETTER
09/23/20        [address] [place] [address]      Dear Gissell Trejo,    [facility] records indicate that you have outstanding lab work and or testing that was ordered for you and has not yet been completed:  Orders Placed This

## (undated) NOTE — MR AVS SNAPSHOT
[address] [address]  [address] [phone]               Thank you for choosing us for your health care visit with Cyndee Hamm MD.  We are glad to serve you and happy to provide you with this normal and high are called prehypertension. Home care  If you have high blood pressure, you should do what is listed below to lower your blood pressure.  If you are taking medicines for high blood pressure, these methods may reduce or end your need for med You will need to make regular visits to your health care provider. This is to check your blood pressure and to make changes to your medicines. Make a follow-up appointment as directed.   When to seek medical advice  Call your health care provider right away [address], [place] [facility] [address]     Phone:  [phone]    - Losartan Potassium-HCTZ 100-25 MG Tabs            Today's Orders     MANAN DIGITAL SCRN BILAT W/CAD (CPT=/16953)    Complete by:  Jan 27, 2017 (Approximate)    As

## (undated) NOTE — LETTER
03/08/18        Mickey Oneal  [facility]      Dear Norberto [facility] records indicate that you have outstanding lab work and or testing that was ordered for you and has not yet been completed:          Sarah Alberts

## (undated) NOTE — LETTER
[facility] Matthews Testing Department  Phone: [phone]  Right Fax: [phone]  [facility] 20 By:  Bailey Bryant RN Date: 10/9/17    Patient Name: Jonny Justo  Surgery Date: 10/16/2017    CSN: [id number]  Medical Record: [id number]